# Patient Record
Sex: FEMALE | Race: WHITE | NOT HISPANIC OR LATINO | Employment: UNEMPLOYED | URBAN - METROPOLITAN AREA
[De-identification: names, ages, dates, MRNs, and addresses within clinical notes are randomized per-mention and may not be internally consistent; named-entity substitution may affect disease eponyms.]

---

## 2017-01-09 RX ORDER — LEVOTHYROXINE SODIUM 0.15 MG/1
TABLET ORAL
Qty: 30 TABLET | Refills: 5 | Status: SHIPPED | OUTPATIENT
Start: 2017-01-09 | End: 2017-03-09 | Stop reason: SDUPTHER

## 2017-01-31 DIAGNOSIS — F41.9 ANXIETY AND DEPRESSION: ICD-10-CM

## 2017-01-31 DIAGNOSIS — F32.A ANXIETY AND DEPRESSION: ICD-10-CM

## 2017-01-31 RX ORDER — ALPRAZOLAM 0.5 MG/1
TABLET ORAL
Qty: 30 TABLET | Refills: 0 | Status: SHIPPED | OUTPATIENT
Start: 2017-01-31 | End: 2017-02-01 | Stop reason: SDUPTHER

## 2017-02-01 DIAGNOSIS — F41.9 ANXIETY AND DEPRESSION: ICD-10-CM

## 2017-02-01 DIAGNOSIS — F32.A ANXIETY AND DEPRESSION: ICD-10-CM

## 2017-02-01 RX ORDER — ALPRAZOLAM 0.5 MG/1
0.5 TABLET ORAL DAILY PRN
Qty: 30 TABLET | Refills: 0 | OUTPATIENT
Start: 2017-02-01 | End: 2017-03-09 | Stop reason: SDUPTHER

## 2017-02-03 DIAGNOSIS — F32.A ANXIETY AND DEPRESSION: ICD-10-CM

## 2017-02-03 DIAGNOSIS — F41.9 ANXIETY AND DEPRESSION: ICD-10-CM

## 2017-02-03 RX ORDER — CLONAZEPAM 0.5 MG/1
0.5 TABLET ORAL DAILY
Qty: 30 TABLET | Refills: 0 | OUTPATIENT
Start: 2017-02-03 | End: 2017-03-09 | Stop reason: SDUPTHER

## 2017-02-03 NOTE — TELEPHONE ENCOUNTER
ANDRES has been printed and placed on your desk.  She skipped last month getting this RF.  She is due for an OV next month to establish with Nuvia Lance for Med. Check

## 2017-03-07 DIAGNOSIS — F32.A ANXIETY AND DEPRESSION: ICD-10-CM

## 2017-03-07 DIAGNOSIS — F41.9 ANXIETY AND DEPRESSION: ICD-10-CM

## 2017-03-07 RX ORDER — ALPRAZOLAM 0.5 MG/1
TABLET ORAL
Qty: 30 TABLET | Refills: 0 | OUTPATIENT
Start: 2017-03-07

## 2017-03-07 RX ORDER — CLONAZEPAM 0.5 MG/1
TABLET ORAL
Qty: 30 TABLET | Refills: 0 | OUTPATIENT
Start: 2017-03-07

## 2017-03-09 ENCOUNTER — OFFICE VISIT (OUTPATIENT)
Dept: FAMILY MEDICINE CLINIC | Facility: CLINIC | Age: 47
End: 2017-03-09

## 2017-03-09 VITALS
DIASTOLIC BLOOD PRESSURE: 82 MMHG | OXYGEN SATURATION: 97 % | RESPIRATION RATE: 18 BRPM | HEIGHT: 68 IN | SYSTOLIC BLOOD PRESSURE: 126 MMHG | BODY MASS INDEX: 25.66 KG/M2 | HEART RATE: 80 BPM | WEIGHT: 169.3 LBS

## 2017-03-09 DIAGNOSIS — E03.9 ACQUIRED HYPOTHYROIDISM: ICD-10-CM

## 2017-03-09 DIAGNOSIS — F41.9 ANXIETY AND DEPRESSION: Primary | ICD-10-CM

## 2017-03-09 DIAGNOSIS — F32.A ANXIETY AND DEPRESSION: Primary | ICD-10-CM

## 2017-03-09 DIAGNOSIS — F41.9 ANXIETY: ICD-10-CM

## 2017-03-09 PROCEDURE — 99213 OFFICE O/P EST LOW 20 MIN: CPT | Performed by: NURSE PRACTITIONER

## 2017-03-09 RX ORDER — CLONAZEPAM 0.5 MG/1
0.5 TABLET ORAL DAILY
Qty: 30 TABLET | Refills: 0 | OUTPATIENT
Start: 2017-03-09 | End: 2017-03-09 | Stop reason: SDUPTHER

## 2017-03-09 RX ORDER — FLUOXETINE HYDROCHLORIDE 40 MG/1
40 CAPSULE ORAL DAILY
Qty: 30 CAPSULE | Refills: 0 | Status: SHIPPED | OUTPATIENT
Start: 2017-03-09 | End: 2017-04-07 | Stop reason: SDUPTHER

## 2017-03-09 RX ORDER — CLONAZEPAM 0.5 MG/1
0.5 TABLET ORAL DAILY
Qty: 30 TABLET | Refills: 0 | Status: SHIPPED | OUTPATIENT
Start: 2017-03-09 | End: 2023-01-05

## 2017-03-09 RX ORDER — ALPRAZOLAM 0.5 MG/1
0.5 TABLET ORAL DAILY PRN
Qty: 30 TABLET | Refills: 0 | OUTPATIENT
Start: 2017-03-09 | End: 2017-03-09 | Stop reason: SDUPTHER

## 2017-03-09 RX ORDER — LEVOTHYROXINE SODIUM 0.15 MG/1
150 TABLET ORAL DAILY
Qty: 30 TABLET | Refills: 1 | Status: SHIPPED | OUTPATIENT
Start: 2017-03-09 | End: 2017-04-07

## 2017-03-09 RX ORDER — ALPRAZOLAM 0.5 MG/1
0.5 TABLET ORAL DAILY PRN
Qty: 30 TABLET | Refills: 0 | Status: SHIPPED | OUTPATIENT
Start: 2017-03-09 | End: 2023-01-05

## 2017-03-09 NOTE — PROGRESS NOTES
"Subjective   Cherelle Bourgeois is a 46 y.o. female.   Is a pt of Dr. Ortiz and is here for her medication refill.  Is not having any problems.    Chief Complaint   Patient presents with   • Anxiety     Medication refills.      Social History   Substance Use Topics   • Smoking status: Never Smoker   • Smokeless tobacco: Never Used   • Alcohol use Yes      Comment: 1-2 per week       History of Present Illness     Review of Systems   Psychiatric/Behavioral: Negative for agitation. The patient is nervous/anxious.         Stress  Depression   anxiety     All other systems reviewed and are negative.      Objective   Vitals:    03/09/17 0907   BP: 126/82   Pulse: 80   Resp: 18   SpO2: 97%   Weight: 169 lb 4.8 oz (76.8 kg)   Height: 68\" (172.7 cm)     Body mass index is 25.74 kg/(m^2).    Physical Exam    Assessment/Plan   Problem List Items Addressed This Visit        Endocrine    Hypothyroidism    Relevant Medications    levothyroxine (SYNTHROID, LEVOTHROID) 150 MCG tablet       Other    Anxiety and depression - Primary    Relevant Medications    FLUoxetine (PROzac) 40 MG capsule    ALPRAZolam (XANAX) 0.5 MG tablet    clonazePAM (KlonoPIN) 0.5 MG tablet      Other Visit Diagnoses     Anxiety        Relevant Medications    FLUoxetine (PROzac) 40 MG capsule         Discussed only being able to give her a month worth of her xanax and klonopin and she wanted to know if Dr Rene would be able to do this and i told her i would inquire.  Car [id number] clean  "

## 2017-04-05 ENCOUNTER — HOSPITAL ENCOUNTER (OUTPATIENT)
Dept: GENERAL RADIOLOGY | Facility: HOSPITAL | Age: 47
Discharge: HOME | End: 2017-04-05
Admitting: NURSE PRACTITIONER

## 2017-04-05 ENCOUNTER — OFFICE VISIT (OUTPATIENT)
Dept: FAMILY MEDICINE CLINIC | Facility: CLINIC | Age: 47
End: 2017-04-05

## 2017-04-05 VITALS
OXYGEN SATURATION: 98 % | BODY MASS INDEX: 25.71 KG/M2 | WEIGHT: 169.1 LBS | SYSTOLIC BLOOD PRESSURE: 130 MMHG | HEART RATE: 77 BPM | DIASTOLIC BLOOD PRESSURE: 88 MMHG

## 2017-04-05 DIAGNOSIS — F32.A ANXIETY AND DEPRESSION: ICD-10-CM

## 2017-04-05 DIAGNOSIS — S22.43XA CLOSED FRACTURE OF MULTIPLE RIBS OF BOTH SIDES, INITIAL ENCOUNTER: ICD-10-CM

## 2017-04-05 DIAGNOSIS — F41.9 ANXIETY AND DEPRESSION: ICD-10-CM

## 2017-04-05 DIAGNOSIS — E03.9 ACQUIRED HYPOTHYROIDISM: ICD-10-CM

## 2017-04-05 DIAGNOSIS — S22.43XA CLOSED FRACTURE OF MULTIPLE RIBS OF BOTH SIDES, INITIAL ENCOUNTER: Primary | ICD-10-CM

## 2017-04-05 LAB
25(OH)D3+25(OH)D2 SERPL-MCNC: 29.1 NG/ML (ref 30–100)
TSH SERPL DL<=0.005 MIU/L-ACNC: 11.72 MIU/ML (ref 0.27–4.2)

## 2017-04-05 PROCEDURE — 71020 HC CHEST PA AND LATERAL: CPT

## 2017-04-05 PROCEDURE — 99214 OFFICE O/P EST MOD 30 MIN: CPT | Performed by: NURSE PRACTITIONER

## 2017-04-05 NOTE — PROGRESS NOTES
Subjective   Cherelle Bourgeois is a 46 y.o. female.   Here for medication refills and is requesting a bone density test.  Is on synthroid for her hypothyroidism and is doing well. Last visit her dose was increased and we will need to recheck it. This has been a problem for her for about 2 years.    She reports that for years and years she has had several undocumented rib fractures in the past. Her injuries include just bending over to pick something up and will hear a pop. She has pain afterwards that lasts a couple of weeks and she takes ibuprofen for the pain. She has never had a work up or any blood work or has never seeked any care in the past.    Also still dealing with her anxiety and depression which she reports is about the same. She does well on her medicines and this has been a chronic problem for several years  I explained to her that Dr Rene does not want to continue to refill her xanax and klonopin that she takes daily and suggested a referral to a psychiatrist.    Chief Complaint   Patient presents with   • Hypothyroidism     follow up would like to schedule bone density      Social History   Substance Use Topics   • Smoking status: Never Smoker   • Smokeless tobacco: Never Used   • Alcohol use Yes      Comment: 1-2 per week       History of Present Illness         Review of Systems   Constitutional: Negative for activity change.   Cardiovascular:        Rt ribcage pain with palpation #'s 7&8   Psychiatric/Behavioral: The patient is nervous/anxious.    All other systems reviewed and are negative.      Objective   Vitals:    04/05/17 0958   BP: 130/88   Pulse: 77   SpO2: 98%   Weight: 169 lb 1.6 oz (76.7 kg)     Body mass index is 25.71 kg/(m^2).    Physical Exam   Constitutional: She appears well-developed and well-nourished. No distress.   HENT:   Head: Normocephalic.   Eyes: EOM are normal.   Cardiovascular: Normal rate and regular rhythm.    Pulmonary/Chest: Effort normal and breath sounds normal. No  respiratory distress. She has no rales. She exhibits tenderness.   Rt ribs 7&8   Skin: Skin is warm.   Nursing note and vitals reviewed.      Assessment/Plan   Problem List Items Addressed This Visit        Endocrine    Hypothyroidism    Relevant Orders    TSH       Other    Anxiety and depression    Relevant Orders    Ambulatory Referral to Psychiatry      Other Visit Diagnoses     Closed fracture of multiple ribs of both sides, initial encounter    -  Primary    Relevant Orders    Vitamin D 25 hydroxy    XR Chest PA & Lateral (Completed)         Will call with results of cxr

## 2017-04-07 DIAGNOSIS — F41.9 ANXIETY: ICD-10-CM

## 2017-04-07 RX ORDER — ERGOCALCIFEROL 1.25 MG/1
50000 CAPSULE ORAL
Qty: 52 CAPSULE | Refills: 0 | Status: SHIPPED | OUTPATIENT
Start: 2017-04-07 | End: 2018-12-19 | Stop reason: SDUPTHER

## 2017-04-07 RX ORDER — FLUOXETINE HYDROCHLORIDE 40 MG/1
CAPSULE ORAL
Qty: 30 CAPSULE | Refills: 0 | Status: SHIPPED | OUTPATIENT
Start: 2017-04-07 | End: 2022-11-04

## 2017-04-07 RX ORDER — LEVOTHYROXINE SODIUM 175 UG/1
175 TABLET ORAL DAILY
Qty: 30 TABLET | Refills: 2 | Status: SHIPPED | OUTPATIENT
Start: 2017-04-07 | End: 2018-04-24 | Stop reason: SDUPTHER

## 2017-08-15 ENCOUNTER — TELEPHONE (OUTPATIENT)
Dept: FAMILY MEDICINE CLINIC | Facility: CLINIC | Age: 47
End: 2017-08-15

## 2017-08-15 NOTE — TELEPHONE ENCOUNTER
Pt called and left a VM stating that she is having back pain and would like to speak with someone.    Called pt back she stated she was suffering from back pain, was asking if muscle relaxer's would help her. Advised pt that I could not giver her information related to diagnosing her back pain and what medication she should/should not take. Advised pt to make an appt.

## 2018-01-18 RX ORDER — LEVOTHYROXINE SODIUM 0.15 MG/1
TABLET ORAL
Qty: 30 TABLET | Refills: 0 | Status: SHIPPED | OUTPATIENT
Start: 2018-01-18 | End: 2018-10-16 | Stop reason: DRUGHIGH

## 2018-04-23 ENCOUNTER — OFFICE VISIT (OUTPATIENT)
Dept: FAMILY MEDICINE CLINIC | Facility: CLINIC | Age: 48
End: 2018-04-23

## 2018-04-23 VITALS
HEIGHT: 68 IN | DIASTOLIC BLOOD PRESSURE: 80 MMHG | HEART RATE: 75 BPM | SYSTOLIC BLOOD PRESSURE: 120 MMHG | OXYGEN SATURATION: 98 % | WEIGHT: 172 LBS | BODY MASS INDEX: 26.07 KG/M2

## 2018-04-23 DIAGNOSIS — E03.9 ACQUIRED HYPOTHYROIDISM: Primary | ICD-10-CM

## 2018-04-23 LAB
T4 FREE SERPL-MCNC: 1.08 NG/DL (ref 0.93–1.7)
TSH SERPL DL<=0.005 MIU/L-ACNC: 16.25 MIU/ML (ref 0.27–4.2)

## 2018-04-23 PROCEDURE — 99213 OFFICE O/P EST LOW 20 MIN: CPT | Performed by: NURSE PRACTITIONER

## 2018-04-23 NOTE — PROGRESS NOTES
"Cherelle Bourgeois is a 47 y.o. female.Patient states that she went off her thyroid medication for the last month because it was too high.she feels like it is too high so she stops taking her medicine despite the lab results.  She reports that she feels tired all the time.      Assessment/Plan   Problem List Items Addressed This Visit        Endocrine    Hypothyroidism - Primary    Relevant Orders    TSH (Completed)    T4, free (Completed)    Thyroid Peroxidase Antibody (Completed)      Other Visit Diagnoses    None.            No Follow-up on file.  There are no Patient Instructions on file for this visit.    Chief Complaint   Patient presents with   • Hypothyroidism     Social History   Substance Use Topics   • Smoking status: Never Smoker   • Smokeless tobacco: Never Used   • Alcohol use Yes      Comment: 1-2 per week       History of Present Illness     The following portions of the patient's history were reviewed and updated as appropriate:PMHroutine: Social history , Allergies, Current Medications and Active Problem List    Review of Systems   Constitutional: Positive for fatigue.   Musculoskeletal: Positive for back pain and myalgias.       Objective   Vitals:    04/23/18 1010   BP: 120/80   Pulse: 75   SpO2: 98%   Weight: 78 kg (172 lb)   Height: 172.7 cm (68\")     Body mass index is 26.15 kg/m².  Physical Exam   Constitutional: She appears well-developed and well-nourished. No distress.   HENT:   Head: Normocephalic and atraumatic.   Right Ear: External ear normal.   Left Ear: External ear normal.   Eyes: EOM are normal.   Neck: Neck supple. No thyromegaly present.   Cardiovascular: Normal rate, regular rhythm and normal heart sounds.    Pulmonary/Chest: Effort normal and breath sounds normal.   Musculoskeletal: Normal range of motion.   Neurological: She is alert.   Skin: Skin is warm.   Nursing note and vitals reviewed.    Reviewed Data:  Office Visit on 04/23/2018   Component Date Value Ref Range Status   • " TSH 04/23/2018 16.250* 0.270 - 4.200 mIU/mL Final   • Free T4 04/23/2018 1.08  0.93 - 1.70 ng/dL Final   • Thyroid Peroxidase Antibody 04/24/2018 >600* 0 - 34 IU/mL Final     Lab work and referral to endocrinology.

## 2018-04-24 DIAGNOSIS — E03.8 HYPOTHYROIDISM DUE TO HASHIMOTO'S THYROIDITIS: Primary | ICD-10-CM

## 2018-04-24 DIAGNOSIS — E06.3 HYPOTHYROIDISM DUE TO HASHIMOTO'S THYROIDITIS: Primary | ICD-10-CM

## 2018-04-24 LAB — THYROPEROXIDASE AB SERPL-ACNC: >600 IU/ML (ref 0–34)

## 2018-04-24 RX ORDER — LEVOTHYROXINE SODIUM 175 UG/1
175 TABLET ORAL DAILY
Qty: 30 TABLET | Refills: 1 | Status: SHIPPED | OUTPATIENT
Start: 2018-04-24 | End: 2018-06-19 | Stop reason: SDUPTHER

## 2018-04-24 NOTE — PATIENT INSTRUCTIONS
Hypothyroidism  Hypothyroidism is a disorder of the thyroid. The thyroid is a large gland that is located in the lower front of the neck. The thyroid releases hormones that control how the body works. With hypothyroidism, the thyroid does not make enough of these hormones.  What are the causes?  Causes of hypothyroidism may include:  · Viral infections.  · Pregnancy.  · Your own defense system (immune system) attacking your thyroid.  · Certain medicines.  · Birth defects.  · Past radiation treatments to your head or neck.  · Past treatment with radioactive iodine.  · Past surgical removal of part or all of your thyroid.  · Problems with the gland that is located in the center of your brain (pituitary).  What are the signs or symptoms?  Signs and symptoms of hypothyroidism may include:  · Feeling as though you have no energy (lethargy).  · Inability to tolerate cold.  · Weight gain that is not explained by a change in diet or exercise habits.  · Dry skin.  · Coarse hair.  · Menstrual irregularity.  · Slowing of thought processes.  · Constipation.  · Sadness or depression.  How is this diagnosed?  Your health care provider may diagnose hypothyroidism with blood tests and ultrasound tests.  How is this treated?  Hypothyroidism is treated with medicine that replaces the hormones that your body does not make. After you begin treatment, it may take several weeks for symptoms to go away.  Follow these instructions at home:  · Take medicines only as directed by your health care provider.  · If you start taking any new medicines, tell your health care provider.  · Keep all follow-up visits as directed by your health care provider. This is important. As your condition improves, your dosage needs may change. You will need to have blood tests regularly so that your health care provider can watch your condition.  Contact a health care provider if:  · Your symptoms do not get better with treatment.  · You are taking thyroid  replacement medicine and:  ¨ You sweat excessively.  ¨ You have tremors.  ¨ You feel anxious.  ¨ You lose weight rapidly.  ¨ You cannot tolerate heat.  ¨ You have emotional swings.  ¨ You have diarrhea.  ¨ You feel weak.  Get help right away if:  · You develop chest pain.  · You develop an irregular heartbeat.  · You develop a rapid heartbeat.  This information is not intended to replace advice given to you by your health care provider. Make sure you discuss any questions you have with your health care provider.  Document Released: 12/18/2006 Document Revised: 05/25/2017 Document Reviewed: 05/05/2015  [employer] Interactive Patient Education © 2017 [employer]

## 2018-06-19 RX ORDER — LEVOTHYROXINE SODIUM 175 UG/1
175 TABLET ORAL DAILY
Qty: 30 TABLET | Refills: 0 | Status: SHIPPED | OUTPATIENT
Start: 2018-06-19 | End: 2019-03-11

## 2018-10-16 ENCOUNTER — OFFICE VISIT (OUTPATIENT)
Dept: FAMILY MEDICINE CLINIC | Facility: CLINIC | Age: 48
End: 2018-10-16

## 2018-10-16 VITALS
BODY MASS INDEX: 25.46 KG/M2 | WEIGHT: 168 LBS | HEART RATE: 80 BPM | RESPIRATION RATE: 16 BRPM | SYSTOLIC BLOOD PRESSURE: 138 MMHG | OXYGEN SATURATION: 99 % | DIASTOLIC BLOOD PRESSURE: 74 MMHG | HEIGHT: 68 IN

## 2018-10-16 DIAGNOSIS — B30.9 ACUTE VIRAL CONJUNCTIVITIS OF RIGHT EYE: ICD-10-CM

## 2018-10-16 DIAGNOSIS — E03.9 ACQUIRED HYPOTHYROIDISM: Primary | ICD-10-CM

## 2018-10-16 DIAGNOSIS — G44.009 CLUSTER HEADACHE, NOT INTRACTABLE, UNSPECIFIED CHRONICITY PATTERN: ICD-10-CM

## 2018-10-16 LAB — TSH SERPL DL<=0.005 MIU/L-ACNC: 11.93 MIU/ML (ref 0.27–4.2)

## 2018-10-16 PROCEDURE — 99214 OFFICE O/P EST MOD 30 MIN: CPT | Performed by: NURSE PRACTITIONER

## 2018-10-16 RX ORDER — MOXIFLOXACIN 5 MG/ML
1 SOLUTION/ DROPS OPHTHALMIC 3 TIMES DAILY
Qty: 3 ML | Refills: 0 | Status: SHIPPED | OUTPATIENT
Start: 2018-10-16 | End: 2019-03-11

## 2018-10-16 NOTE — PROGRESS NOTES
"Cherelle Bourgeois is a 48 y.o. female.      Assessment/Plan   Problem List Items Addressed This Visit        Endocrine    Hypothyroidism - Primary    Relevant Orders    TSH      Other Visit Diagnoses     Acute viral conjunctivitis of right eye        Relevant Medications    moxifloxacin (VIGAMOX) 0.5 % ophthalmic solution    Cluster headache, not intractable, unspecified chronicity pattern                 No Follow-up on file.  There are no Patient Instructions on file for this visit.    Chief Complaint   Patient presents with   • Eye Problem   • Headache     Social History   Substance Use Topics   • Smoking status: Never Smoker   • Smokeless tobacco: Never Used   • Alcohol use Yes      Comment: 1-2 per week       History of Present Illness   Cherelle is here w/ red, itchy rt eye.  She states she has been doing yard work and originally thought it was allergy related. She has also resumed wearing contacts recentlu.  She states itching was severe last night and she used an ice pack.  No otc drops.    Cherelle is also c/o headache.  She states for the past 3 weeks she has had sporadic, sharp shooting pain in her head.  She states it lasts only for a few seconds and happens several times throughout the day.    H/O hypothyroid and cancelled her appointment with the endocrinologist.    The following portions of the patient's history were reviewed and updated as appropriate:PMHroutine: Social history , Allergies, Current Medications, Active Problem List and Health Maintenance    Review of Systems   Constitutional: Negative for fever.   HENT: Negative for congestion, ear discharge, sinus pain and sinus pressure.    Eyes: Positive for redness and itching.   Neurological: Positive for headaches.   All other systems reviewed and are negative.      Objective   Vitals:    10/16/18 0734   BP: 138/74   Pulse: 80   Resp: 16   SpO2: 99%   Weight: 76.2 kg (168 lb)   Height: 172.7 cm (68\")     Body mass index is 25.54 kg/m².  Physical " Exam   Constitutional: She appears well-developed and well-nourished. No distress.   HENT:   Head: Normocephalic and atraumatic.   Right Ear: External ear normal.   Left Ear: External ear normal.   Mouth/Throat: Oropharynx is clear and moist.   Eyes: Pupils are equal, round, and reactive to light. EOM are normal.   Fundoscopic exam:       The right eye shows no AV nicking and no exudate.        The left eye shows no AV nicking and no exudate.   Neck: Neck supple. No thyromegaly present.   Cardiovascular: Normal rate and regular rhythm.    Pulmonary/Chest: Effort normal and breath sounds normal.   Musculoskeletal: Normal range of motion.   Neurological: She is alert.   Nursing note and vitals reviewed.    Reviewed Data:  No visits with results within 1 Month(s) from this visit.   Latest known visit with results is:   Office Visit on 04/23/2018   Component Date Value Ref Range Status   • TSH 04/23/2018 16.250* 0.270 - 4.200 mIU/mL Final   • Free T4 04/23/2018 1.08  0.93 - 1.70 ng/dL Final   • Thyroid Peroxidase Antibody 04/23/2018 >600* 0 - 34 IU/mL Final     Will keep a headache diary and get back with us.  Discussed calling the endocrinologist back but she doesn't seem interested.

## 2018-10-22 DIAGNOSIS — E03.9 ACQUIRED HYPOTHYROIDISM: Primary | ICD-10-CM

## 2018-12-18 ENCOUNTER — OFFICE VISIT (OUTPATIENT)
Dept: ENDOCRINOLOGY | Age: 48
End: 2018-12-18

## 2018-12-18 VITALS
BODY MASS INDEX: 31.47 KG/M2 | DIASTOLIC BLOOD PRESSURE: 78 MMHG | HEIGHT: 62 IN | OXYGEN SATURATION: 98 % | HEART RATE: 86 BPM | SYSTOLIC BLOOD PRESSURE: 130 MMHG | WEIGHT: 171 LBS

## 2018-12-18 DIAGNOSIS — E03.9 ACQUIRED HYPOTHYROIDISM: Primary | ICD-10-CM

## 2018-12-18 DIAGNOSIS — R53.82 CHRONIC FATIGUE: ICD-10-CM

## 2018-12-18 LAB
25(OH)D3+25(OH)D2 SERPL-MCNC: 26.1 NG/ML (ref 30–100)
FOLATE SERPL-MCNC: 16.13 NG/ML (ref 4.78–24.2)
T4 FREE SERPL-MCNC: 0.98 NG/DL (ref 0.93–1.7)
TSH SERPL DL<=0.005 MIU/L-ACNC: 9.4 MIU/ML (ref 0.27–4.2)
VIT B12 SERPL-MCNC: 447 PG/ML (ref 211–946)

## 2018-12-18 PROCEDURE — 99204 OFFICE O/P NEW MOD 45 MIN: CPT | Performed by: INTERNAL MEDICINE

## 2018-12-18 NOTE — PROGRESS NOTES
Chief Complaint   Patient presents with   • Hypothyroidism   NEW PATIENT APPOINTMENT/ HYPOTHYROIDISM    Cherelle Bourgeois 48 y.o. WF presents as a new patient for the evaluation of Hypothyroidism. Consulted by Miss. Casi ONEAL  Pt was diagnosed with hypothyroidism few years ago when she had issues with fatigue and depression.   When her thyroid levels were abnormal, she was started on the thyroid medication.   Currently on levothyroxine 175 mcg oral daily, prior to that she was on 150 mcg oral daily.   Not taking the medication for about 3 weeks now as she is coming to see me.     She complains of feeling tired still, gaining weight of about 5 - 6 pounds in the last 1 - 2 months, c/o diarrhea 3 BM per day, no hair loss, no increased sweating, some dry skin, sleep is bad due to waking up in sleep. Never had sleep study. c/o heat intolerance and no cold intolerance. c/o tremors occasionally, no racing of heart and no eye symptoms.   Denied c/o difficulty breathing, swallowing and change in voice.   No family hx of thyroid disease.     Menarche at age 10, periods are regular when she had them. She had hysterectomy at age 38, due to endometriosis.   She has 1 kid of her own.     Reviewed primary care physician's/consulting physician documentation and lab results :     I have reviewed the patient's allergies, medicines, past medical hx, family hx and social hx in detail.    Past Medical History:   Diagnosis Date   • Anxiety and depression     denies SI/HI. Doing well on meds.  Risk/benefits of meds discussed (abuse, addiction, dementia, CVA).  Car, contract and urine tox in chart.   • Broken rib     10 days ago leaned over to get something out of car   • Chronic back pain     followed by chiropractor   • Hypothyroidism    • Postmenopausal     due to hysterectomy       Family History   Problem Relation Age of Onset   • Coronary artery disease Father 42   • Coronary artery disease Sister 50        (50's)   • Coronary  artery disease Brother 42   • Coronary artery disease Maternal Grandfather 40        (40's)   • Coronary artery disease Paternal Grandfather 41   • Coronary artery disease Sister 50        (50's)   • Hypertension Mother        Social History     Socioeconomic History   • Marital status:      Spouse name: Not on file   • Number of children: Not on file   • Years of education: Not on file   • Highest education level: Not on file   Social Needs   • Financial resource strain: Not on file   • Food insecurity - worry: Not on file   • Food insecurity - inability: Not on file   • Transportation needs - medical: Not on file   • Transportation needs - non-medical: Not on file   Occupational History   • Not on file   Tobacco Use   • Smoking status: Never Smoker   • Smokeless tobacco: Never Used   Substance and Sexual Activity   • Alcohol use: Yes     Comment: 1-2 per week   • Drug use: No   • Sexual activity: Defer   Other Topics Concern   • Not on file   Social History Narrative   • Not on file       Allergies   Allergen Reactions   • Amoxicillin Hives   • Augmentin [Amoxicillin-Pot Clavulanate] Hives   • Keflex [Cephalexin] Hives         Current Outpatient Medications:   •  ALPRAZolam (XANAX) 0.5 MG tablet, Take 1 tablet by mouth Daily As Needed for Anxiety., Disp: 30 tablet, Rfl: 0  •  aspirin 81 MG chewable tablet, Chew 81 mg daily., Disp: , Rfl:   •  clonazePAM (KlonoPIN) 0.5 MG tablet, Take 1 tablet by mouth Daily., Disp: 30 tablet, Rfl: 0  •  FLUoxetine (PROzac) 40 MG capsule, TAKE 1 CAPSULE BY MOUTH DAILY (Patient taking differently: Taking 3 20mg Capsules daily), Disp: 30 capsule, Rfl: 0  •  moxifloxacin (VIGAMOX) 0.5 % ophthalmic solution, Administer 1 drop to the right eye 3 (Three) Times a Day., Disp: 3 mL, Rfl: 0  •  vitamin D (ERGOCALCIFEROL) 29028 UNITS capsule capsule, Take 1 capsule by mouth Every 7 (Seven) Days., Disp: 52 capsule, Rfl: 0  •  fluticasone (FLONASE) 50 MCG/ACT nasal spray, 2 sprays into  "each nostril daily. Administer 2 sprays in each nostril for each dose., Disp: , Rfl:   •  levothyroxine (SYNTHROID, LEVOTHROID) 175 MCG tablet, TAKE 1 TABLET BY MOUTH DAILY, Disp: 30 tablet, Rfl: 0     Review of Systems   Constitutional: Positive for fatigue. Negative for appetite change and fever.   Eyes: Negative for visual disturbance.   Respiratory: Negative for shortness of breath.    Cardiovascular: Negative for palpitations and leg swelling.   Gastrointestinal: Negative for abdominal pain and vomiting.   Endocrine: Negative for polydipsia and polyuria.   Musculoskeletal: Negative for joint swelling and neck pain.   Skin: Negative for rash.   Neurological: Negative for weakness and numbness.   Psychiatric/Behavioral: Negative for behavioral problems.       Objective:    /78   Pulse 86   Ht 157.5 cm (62\")   Wt 77.6 kg (171 lb)   SpO2 98%   BMI 31.28 kg/m²     Physical Exam   Constitutional: She is oriented to person, place, and time. She appears well-nourished.   Obese     HENT:   Head: Normocephalic and atraumatic.   Wide neck   Eyes: Conjunctivae and EOM are normal. No scleral icterus.   Neck: Normal range of motion. Neck supple. No thyromegaly present.   Acanthosis nigricans   Cardiovascular: Normal rate and normal heart sounds.   Pulmonary/Chest: Effort normal and breath sounds normal. No stridor. She has no wheezes.   Abdominal: Soft. Bowel sounds are normal. She exhibits no distension. There is no tenderness.   Central obesity   Musculoskeletal: She exhibits no edema or tenderness.   Neurological: She is alert and oriented to person, place, and time.   Skin: Skin is warm and dry. She is not diaphoretic.   Psychiatric: She has a normal mood and affect.   Vitals reviewed.      Results Review:    I reviewed the patient's new clinical results.    Office Visit on 10/16/2018   Component Date Value Ref Range Status   • TSH 10/16/2018 11.930* 0.270 - 4.200 mIU/mL Final         Cherelle was seen today " "for hypothyroidism.    Diagnoses and all orders for this visit:    Acquired hypothyroidism  -     TSH  -     T4, Free  -     Vitamin D 25 Hydroxy  -     Vitamin B12 & Folate  -     TSH; Future  -     T4, Free; Future  -     Hemoglobin A1c; Future  -     Basic Metabolic Panel; Future  -     Lipid Panel; Future  -     Vitamin B12 & Folate; Future  -     Vitamin D 25 Hydroxy; Future  -     TSH; Future  -     T4, Free; Future    Chronic fatigue  -     TSH  -     T4, Free  -     Vitamin D 25 Hydroxy  -     Vitamin B12 & Folate  -     TSH; Future  -     T4, Free; Future  -     Hemoglobin A1c; Future  -     Basic Metabolic Panel; Future  -     Lipid Panel; Future  -     Vitamin B12 & Folate; Future  -     Vitamin D 25 Hydroxy; Future      Hypothyroidism-levels are not stable  Patient is currently not on any thyroid medication  Will check a TSH, free T4 levels  Based on her levels would consider starting the patient on Synthroid.    Will check HbA1c, lipid panel.    Will consider performing thyroid ultrasound once her thyroid levels have normalized.    Discussed with the patient not to miss her thyroid medication and to take it on empty stomach at least 30 minutes before taking the rest of the medications.    Thank you for asking me to see your patient, Cherelle Bourgeois in consultation.        Temi Solomon MD  12/18/18    [facility] Dragon / transcription disclaimer:     \"Dictated utilizing Dragon dictation\".          "

## 2018-12-19 RX ORDER — ERGOCALCIFEROL 1.25 MG/1
50000 CAPSULE ORAL
Qty: 52 CAPSULE | Refills: 0 | Status: SHIPPED | OUTPATIENT
Start: 2018-12-19 | End: 2020-01-06

## 2018-12-19 RX ORDER — LEVOTHYROXINE SODIUM 112 UG/1
112 TABLET ORAL DAILY
Qty: 30 TABLET | Refills: 11 | Status: SHIPPED | OUTPATIENT
Start: 2018-12-19 | End: 2019-02-06 | Stop reason: DRUGHIGH

## 2019-01-29 ENCOUNTER — RESULTS ENCOUNTER (OUTPATIENT)
Dept: ENDOCRINOLOGY | Age: 49
End: 2019-01-29

## 2019-01-29 DIAGNOSIS — E03.9 ACQUIRED HYPOTHYROIDISM: ICD-10-CM

## 2019-02-04 LAB
T4 FREE SERPL-MCNC: 1.22 NG/DL (ref 0.93–1.7)
TSH SERPL DL<=0.005 MIU/L-ACNC: 7.42 MIU/ML (ref 0.27–4.2)

## 2019-02-06 RX ORDER — LEVOTHYROXINE SODIUM 100 MCG
100 TABLET ORAL DAILY
Qty: 30 TABLET | Refills: 11 | Status: SHIPPED | OUTPATIENT
Start: 2019-02-06 | End: 2019-03-11

## 2019-02-27 ENCOUNTER — LAB (OUTPATIENT)
Dept: ENDOCRINOLOGY | Age: 49
End: 2019-02-27

## 2019-02-27 DIAGNOSIS — E03.9 ACQUIRED HYPOTHYROIDISM: ICD-10-CM

## 2019-02-27 DIAGNOSIS — R53.82 CHRONIC FATIGUE: ICD-10-CM

## 2019-02-28 LAB
25(OH)D3+25(OH)D2 SERPL-MCNC: 30 NG/ML (ref 30–100)
BUN SERPL-MCNC: 11 MG/DL (ref 6–20)
BUN/CREAT SERPL: 13.1 (ref 7–25)
CALCIUM SERPL-MCNC: 10.6 MG/DL (ref 8.6–10.5)
CHLORIDE SERPL-SCNC: 102 MMOL/L (ref 98–107)
CHOLEST SERPL-MCNC: 233 MG/DL (ref 0–200)
CO2 SERPL-SCNC: 30 MMOL/L (ref 22–29)
CREAT SERPL-MCNC: 0.84 MG/DL (ref 0.57–1)
FOLATE SERPL-MCNC: >20 NG/ML (ref 4.78–24.2)
GLUCOSE SERPL-MCNC: 94 MG/DL (ref 65–99)
HBA1C MFR BLD: 5.4 % (ref 4.8–5.6)
HDLC SERPL-MCNC: 59 MG/DL (ref 40–60)
INTERPRETATION: NORMAL
LDLC SERPL CALC-MCNC: 154 MG/DL (ref 0–100)
Lab: NORMAL
POTASSIUM SERPL-SCNC: 5.2 MMOL/L (ref 3.5–5.2)
SODIUM SERPL-SCNC: 137 MMOL/L (ref 136–145)
T4 FREE SERPL-MCNC: 1.53 NG/DL (ref 0.93–1.7)
TRIGL SERPL-MCNC: 99 MG/DL (ref 0–150)
TSH SERPL DL<=0.005 MIU/L-ACNC: 6.13 MIU/ML (ref 0.27–4.2)
VIT B12 SERPL-MCNC: 570 PG/ML (ref 211–946)
VLDLC SERPL CALC-MCNC: 19.8 MG/DL (ref 5–40)

## 2019-03-11 ENCOUNTER — OFFICE VISIT (OUTPATIENT)
Dept: ENDOCRINOLOGY | Age: 49
End: 2019-03-11

## 2019-03-11 VITALS
OXYGEN SATURATION: 98 % | DIASTOLIC BLOOD PRESSURE: 82 MMHG | BODY MASS INDEX: 31.47 KG/M2 | SYSTOLIC BLOOD PRESSURE: 118 MMHG | HEIGHT: 62 IN | HEART RATE: 78 BPM | WEIGHT: 171 LBS

## 2019-03-11 DIAGNOSIS — E03.9 ACQUIRED HYPOTHYROIDISM: Primary | ICD-10-CM

## 2019-03-11 DIAGNOSIS — R53.82 CHRONIC FATIGUE: ICD-10-CM

## 2019-03-11 PROCEDURE — 99214 OFFICE O/P EST MOD 30 MIN: CPT | Performed by: INTERNAL MEDICINE

## 2019-03-11 RX ORDER — LEVOTHYROXINE SODIUM 112 MCG
112 TABLET ORAL DAILY
Qty: 30 TABLET | Refills: 11 | Status: SHIPPED | OUTPATIENT
Start: 2019-03-11 | End: 2019-11-11
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
## 2019-04-22 ENCOUNTER — RESULTS ENCOUNTER (OUTPATIENT)
Dept: ENDOCRINOLOGY | Age: 49
End: 2019-04-22

## 2019-04-22 DIAGNOSIS — E03.9 ACQUIRED HYPOTHYROIDISM: ICD-10-CM

## 2019-04-22 DIAGNOSIS — R53.82 CHRONIC FATIGUE: ICD-10-CM

## 2019-09-07 ENCOUNTER — RESULTS ENCOUNTER (OUTPATIENT)
Dept: ENDOCRINOLOGY | Age: 49
End: 2019-09-07

## 2019-09-07 DIAGNOSIS — E03.9 ACQUIRED HYPOTHYROIDISM: ICD-10-CM

## 2019-09-07 DIAGNOSIS — R53.82 CHRONIC FATIGUE: ICD-10-CM

## 2019-10-28 LAB
25(OH)D3+25(OH)D2 SERPL-MCNC: 32.6 NG/ML (ref 30–100)
BUN SERPL-MCNC: 12 MG/DL (ref 6–20)
BUN/CREAT SERPL: 17.4 (ref 7–25)
CALCIUM SERPL-MCNC: 9.8 MG/DL (ref 8.6–10.5)
CHLORIDE SERPL-SCNC: 103 MMOL/L (ref 98–107)
CHOLEST SERPL-MCNC: 230 MG/DL (ref 0–200)
CO2 SERPL-SCNC: 29.4 MMOL/L (ref 22–29)
CREAT SERPL-MCNC: 0.69 MG/DL (ref 0.57–1)
FOLATE SERPL-MCNC: 12.8 NG/ML (ref 4.78–24.2)
GLUCOSE SERPL-MCNC: 93 MG/DL (ref 65–99)
HBA1C MFR BLD: 5.5 % (ref 4.8–5.6)
HDLC SERPL-MCNC: 54 MG/DL (ref 40–60)
INTERPRETATION: NORMAL
LDLC SERPL CALC-MCNC: 149 MG/DL (ref 0–100)
Lab: NORMAL
POTASSIUM SERPL-SCNC: 4.9 MMOL/L (ref 3.5–5.2)
SODIUM SERPL-SCNC: 142 MMOL/L (ref 136–145)
T4 FREE SERPL-MCNC: 1.55 NG/DL (ref 0.93–1.7)
TRIGL SERPL-MCNC: 137 MG/DL (ref 0–150)
TSH SERPL DL<=0.005 MIU/L-ACNC: 4.3 UIU/ML (ref 0.27–4.2)
VIT B12 SERPL-MCNC: 384 PG/ML (ref 211–946)
VLDLC SERPL CALC-MCNC: 27.4 MG/DL

## 2019-11-11 ENCOUNTER — OFFICE VISIT (OUTPATIENT)
Dept: ENDOCRINOLOGY | Age: 49
End: 2019-11-11

## 2019-11-11 VITALS
DIASTOLIC BLOOD PRESSURE: 78 MMHG | HEIGHT: 63 IN | WEIGHT: 170 LBS | BODY MASS INDEX: 30.12 KG/M2 | SYSTOLIC BLOOD PRESSURE: 120 MMHG | OXYGEN SATURATION: 99 % | HEART RATE: 64 BPM

## 2019-11-11 DIAGNOSIS — E03.9 ACQUIRED HYPOTHYROIDISM: Primary | ICD-10-CM

## 2019-11-11 DIAGNOSIS — R53.82 CHRONIC FATIGUE: ICD-10-CM

## 2019-11-11 DIAGNOSIS — M25.561 ARTHRALGIA OF BOTH KNEES: ICD-10-CM

## 2019-11-11 DIAGNOSIS — Z23 NEED FOR IMMUNIZATION AGAINST INFLUENZA: Primary | ICD-10-CM

## 2019-11-11 DIAGNOSIS — M25.562 ARTHRALGIA OF BOTH KNEES: ICD-10-CM

## 2019-11-11 PROCEDURE — 90471 IMMUNIZATION ADMIN: CPT | Performed by: INTERNAL MEDICINE

## 2019-11-11 PROCEDURE — 99214 OFFICE O/P EST MOD 30 MIN: CPT | Performed by: INTERNAL MEDICINE

## 2019-11-11 PROCEDURE — 90674 CCIIV4 VAC NO PRSV 0.5 ML IM: CPT | Performed by: INTERNAL MEDICINE

## 2019-11-11 RX ORDER — LEVOTHYROXINE SODIUM 137 MCG
137 TABLET ORAL DAILY
Qty: 30 TABLET | Refills: 11 | Status: SHIPPED | OUTPATIENT
Start: 2019-11-11 | End: 2021-07-27 | Stop reason: SDDI
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
## 2019-12-12 ENCOUNTER — OFFICE VISIT (OUTPATIENT)
Dept: FAMILY MEDICINE CLINIC | Facility: CLINIC | Age: 49
End: 2019-12-12

## 2019-12-12 ENCOUNTER — TELEPHONE (OUTPATIENT)
Dept: FAMILY MEDICINE CLINIC | Facility: CLINIC | Age: 49
End: 2019-12-12

## 2019-12-12 VITALS
SYSTOLIC BLOOD PRESSURE: 140 MMHG | OXYGEN SATURATION: 98 % | HEIGHT: 63 IN | BODY MASS INDEX: 30.48 KG/M2 | DIASTOLIC BLOOD PRESSURE: 80 MMHG | WEIGHT: 172 LBS | HEART RATE: 76 BPM

## 2019-12-12 DIAGNOSIS — M25.549 ARTHRALGIA OF HAND, UNSPECIFIED LATERALITY: Primary | ICD-10-CM

## 2019-12-12 PROCEDURE — 99213 OFFICE O/P EST LOW 20 MIN: CPT | Performed by: NURSE PRACTITIONER

## 2019-12-12 RX ORDER — MELOXICAM 7.5 MG/1
7.5 TABLET ORAL DAILY
Qty: 30 TABLET | Refills: 0 | Status: SHIPPED | OUTPATIENT
Start: 2019-12-12 | End: 2021-07-27

## 2019-12-12 NOTE — PROGRESS NOTES
Subjective back pain forever and this morning she had joint pain all over  Cherelle Bourgeois is a 49 y.o. female.     History of Present Illness   Started with joint pain all over yesterday. Her son has autoimmune encephalitis.  The following portions of the patient's history were reviewed and updated as appropriate: allergies, current medications, past family history, past medical history, past social history, past surgical history and problem list.    Review of Systems   Musculoskeletal:        Joint pain       Objective   Physical Exam   Constitutional: She is oriented to person, place, and time. She appears well-developed and well-nourished.   HENT:   Head: Normocephalic and atraumatic.   Mouth/Throat: Oropharynx is clear and moist.   Eyes: Pupils are equal, round, and reactive to light. Conjunctivae and EOM are normal.   Neck: Neck supple.   Cardiovascular: Normal rate and regular rhythm.   Pulmonary/Chest: Effort normal and breath sounds normal. No respiratory distress.   Abdominal: Bowel sounds are normal.   Musculoskeletal: Normal range of motion. She exhibits no tenderness.   Lymphadenopathy:     She has no cervical adenopathy.   Neurological: She is alert and oriented to person, place, and time.   Skin: Skin is warm and dry.   Psychiatric: She has a normal mood and affect.   Nursing note and vitals reviewed.        Assessment/Plan   Cherelle was seen today for joint swelling.    Diagnoses and all orders for this visit:    Arthralgia of hand, unspecified laterality  -     CBC (No Diff)  -     Uric acid  -     Antistreptolysin O screen  -     Rheumatoid factor  -     C-reactive protein  -     TAL    Other orders  -     meloxicam (MOBIC) 7.5 MG tablet; Take 1 tablet by mouth Daily.

## 2019-12-12 NOTE — PATIENT INSTRUCTIONS
Joint Pain    Joint pain can be caused by many things. It is likely to go away if you follow instructions from your doctor for taking care of yourself at home. Sometimes, you may need more treatment.  Follow these instructions at home:  Managing pain, stiffness, and swelling    · If told, put ice on the painful area.  ? Put ice in a plastic bag.  ? Place a towel between your skin and the bag.  ? Leave the ice on for 20 minutes, 2-3 times a day.  · If told, put heat on the painful area. Do this as often as told by your doctor. Use the heat source that your doctor recommends, such as a moist heat pack or a heating pad.  ? Place a towel between your skin and the heat source.  ? Leave the heat on for 20-30 minutes.  ? Take off the heat if your skin gets bright red. This is especially important if you are unable to feel pain, heat, or cold. You may have a greater risk of getting burned.  · Move your fingers or toes below the painful joint often. This helps with stiffness and swelling.  · If possible, raise (elevate) the painful joint above the level of your heart while you are sitting or lying down. To do this, try putting a few pillows under the painful joint.  Activity  · Rest the painful joint for as long as told. Do not do things that cause pain or make your pain worse.  · Begin exercising or stretching the affected area, as told by your doctor. Ask your doctor what types of exercise are safe for you.  If you have an elastic bandage, sling, or splint:  · Wear the device as told by your doctor. Take it off only as told by your doctor.  · Loosen the device if your fingers or toes below the joint:  ? Tingle.  ? Lose feeling (get numb).  ? Get cold and blue.  · Keep the device clean.  · Ask your doctor if you should take off the device before bathing. You may need to cover it with a watertight covering when you take a bath or a shower.  General instructions  · Take over-the-counter and prescription medicines only as told  by your doctor.  · Do not use any products that contain nicotine or tobacco. These include cigarettes and e-cigarettes. If you need help quitting, ask your doctor.  · Keep all follow-up visits as told by your doctor. This is important.  Contact a doctor if:  · You have pain that gets worse and does not get better with medicine.  · Your joint pain does not get better in 3 days.  · You have more bruising or swelling.  · You have a fever.  · You lose 10 lb (4.5 kg) or more without trying.  Get help right away if:  · You cannot move the joint.  · Your fingers or toes tingle, lose feeling, or get cold and blue.  · You have a fever along with a joint that is red, warm, and swollen.  Summary  · Joint pain can be caused by many things. It often goes away if you follow instructions from your doctor for taking care of yourself at home.  · Rest the painful joint for as long as told. Do not do things that cause pain or make your pain worse.  · Take over-the-counter and prescription medicines only as told by your doctor.  This information is not intended to replace advice given to you by your health care provider. Make sure you discuss any questions you have with your health care provider.  Document Released: 12/06/2010 Document Revised: 10/03/2018 Document Reviewed: 10/03/2018  [employer] Interactive Patient Education © 2019 [employer]

## 2019-12-13 LAB
ANA SER QL: NEGATIVE
CRP SERPL-MCNC: 0.07 MG/DL (ref 0–0.5)
ERYTHROCYTE [DISTWIDTH] IN BLOOD BY AUTOMATED COUNT: 13.2 % (ref 12.3–15.4)
HCT VFR BLD AUTO: 46 % (ref 34–46.6)
HGB BLD-MCNC: 15 G/DL (ref 12–15.9)
MCH RBC QN AUTO: 27.3 PG (ref 26.6–33)
MCHC RBC AUTO-ENTMCNC: 32.6 G/DL (ref 31.5–35.7)
MCV RBC AUTO: 83.6 FL (ref 79–97)
PLATELET # BLD AUTO: 254 10*3/MM3 (ref 140–450)
RBC # BLD AUTO: 5.5 10*6/MM3 (ref 3.77–5.28)
URATE SERPL-MCNC: 3.7 MG/DL (ref 2.4–5.7)
WBC # BLD AUTO: 5.88 10*3/MM3 (ref 3.4–10.8)

## 2020-01-06 RX ORDER — ERGOCALCIFEROL 1.25 MG/1
CAPSULE ORAL
Qty: 12 CAPSULE | Refills: 1 | Status: SHIPPED | OUTPATIENT
Start: 2020-01-06 | End: 2020-09-01

## 2020-09-01 RX ORDER — ERGOCALCIFEROL 1.25 MG/1
CAPSULE ORAL
Qty: 12 CAPSULE | Refills: 1 | Status: SHIPPED | OUTPATIENT
Start: 2020-09-01

## 2020-11-30 RX ORDER — LEVOTHYROXINE SODIUM 137 MCG
137 TABLET ORAL DAILY
Qty: 30 TABLET | Refills: 11 | OUTPATIENT
Start: 2020-11-30

## 2020-12-02 ENCOUNTER — OFFICE VISIT (OUTPATIENT)
Dept: FAMILY MEDICINE CLINIC | Facility: CLINIC | Age: 50
End: 2020-12-02

## 2020-12-02 VITALS
RESPIRATION RATE: 16 BRPM | BODY MASS INDEX: 29.05 KG/M2 | TEMPERATURE: 96.9 F | SYSTOLIC BLOOD PRESSURE: 124 MMHG | WEIGHT: 164 LBS | HEART RATE: 87 BPM | OXYGEN SATURATION: 98 % | DIASTOLIC BLOOD PRESSURE: 78 MMHG

## 2020-12-02 DIAGNOSIS — M19.041 OSTEOARTHRITIS OF BOTH HANDS, UNSPECIFIED OSTEOARTHRITIS TYPE: Primary | ICD-10-CM

## 2020-12-02 DIAGNOSIS — M19.042 OSTEOARTHRITIS OF BOTH HANDS, UNSPECIFIED OSTEOARTHRITIS TYPE: Primary | ICD-10-CM

## 2020-12-02 DIAGNOSIS — H65.03 NON-RECURRENT ACUTE SEROUS OTITIS MEDIA OF BOTH EARS: ICD-10-CM

## 2020-12-02 PROCEDURE — 99213 OFFICE O/P EST LOW 20 MIN: CPT | Performed by: NURSE PRACTITIONER

## 2020-12-02 RX ORDER — NEOMYCIN SULFATE, POLYMYXIN B SULFATE AND HYDROCORTISONE 10; 3.5; 1 MG/ML; MG/ML; [USP'U]/ML
3 SUSPENSION/ DROPS AURICULAR (OTIC) 3 TIMES DAILY
Qty: 10 ML | Refills: 0 | Status: SHIPPED | OUTPATIENT
Start: 2020-12-02 | End: 2020-12-07

## 2020-12-02 NOTE — PATIENT INSTRUCTIONS

## 2020-12-02 NOTE — PROGRESS NOTES
Subjective   Cherelle Bourgeois is a 50 y.o. female.   Earache, Left Ear.    History of Present Illness   Started yesterday with sharp stabbing inner ear pains.  She had old benzocaine eardrops that she used which seem to help.  She is also having trouble with bilateral hand pain in the morning.  She was seen earlier by Mere Palmer the rheumatologist who offered her help but she refused.  She was diagnosed with osteoarthritis.  She reports she did not like the office and would like to be referred somewhere else.  All of her labs for this were normal.    The following portions of the patient's history were reviewed and updated as appropriate: allergies, current medications, past family history, past medical history, past social history, past surgical history and problem list.    Review of Systems   Constitutional: Negative for activity change, appetite change, chills and fever.   HENT: Positive for ear pain. Negative for congestion, ear discharge, rhinorrhea and sinus pressure.    Eyes: Negative for pain.   Respiratory: Negative for cough and shortness of breath.    Cardiovascular: Negative for chest pain and leg swelling.   Gastrointestinal: Negative for nausea and vomiting.   Genitourinary: Negative for difficulty urinating.   Musculoskeletal:        Bilateral hand pain   Skin: Negative for rash.   Neurological: Negative for dizziness, facial asymmetry and headache.       Objective   Physical Exam  Vitals signs and nursing note reviewed.   Constitutional:       General: She is not in acute distress.     Appearance: She is well-developed.   HENT:      Head: Normocephalic and atraumatic.      Comments: Her right TM had serous fluid unable to visualize TM     Right Ear: External ear normal.      Left Ear: Tympanic membrane and external ear normal.   Neck:      Musculoskeletal: Neck supple.      Thyroid: No thyromegaly.   Cardiovascular:      Rate and Rhythm: Normal rate and regular rhythm.      Heart sounds: Normal  heart sounds.   Pulmonary:      Effort: Pulmonary effort is normal.      Breath sounds: Normal breath sounds.   Musculoskeletal: Normal range of motion.         General: No swelling or tenderness.   Skin:     General: Skin is warm.   Neurological:      Mental Status: She is alert.           Assessment/Plan   Cherelle was seen today for earache.  Diagnoses and all orders for this visit:  Osteoarthritis of both hands, unspecified osteoarthritis type (Primary)  -     Ambulatory Referral to Rheumatology  Non-recurrent acute serous otitis media of both ears  Other orders  -     neomycin-polymyxin-hydrocortisone (CORTISPORIN) [id number] otic suspension    She requested a muscle relaxer for her hands every morning.  I had a discussion with her about osteoarthritis and treatment that would help her.  I told her putting her on a daily medicine that she did not need was not a good idea.  She asked for another referral to a different rheumatologist, and requested Dr. Galindo.     Cortisporin to bilateral ears for 5 days as discussed  No follow-ups on file.

## 2021-04-16 DIAGNOSIS — Z12.31 ENCOUNTER FOR SCREENING MAMMOGRAM FOR MALIGNANT NEOPLASM OF BREAST: Primary | ICD-10-CM

## 2021-04-19 ENCOUNTER — TELEPHONE (OUTPATIENT)
Dept: FAMILY MEDICINE CLINIC | Facility: CLINIC | Age: 51
End: 2021-04-19

## 2021-04-19 DIAGNOSIS — N63.20 LEFT BREAST LUMP: Primary | ICD-10-CM

## 2021-04-19 NOTE — TELEPHONE ENCOUNTER
Caller: Cherelle Bourgeois    Relationship: Self    Best call back number: [phone]    What orders are you requesting (i.e. lab or imaging): DIGNOSTIC MAMMOGRAM    In what timeframe would the patient need to come in: ASAP    Where will you receive your lab/imaging services: [facility]  PH: [phone]

## 2021-07-27 ENCOUNTER — OFFICE VISIT (OUTPATIENT)
Dept: FAMILY MEDICINE CLINIC | Facility: CLINIC | Age: 51
End: 2021-07-27

## 2021-07-27 VITALS
HEART RATE: 74 BPM | OXYGEN SATURATION: 97 % | BODY MASS INDEX: 28.35 KG/M2 | SYSTOLIC BLOOD PRESSURE: 150 MMHG | RESPIRATION RATE: 16 BRPM | HEIGHT: 63 IN | WEIGHT: 160 LBS | DIASTOLIC BLOOD PRESSURE: 100 MMHG

## 2021-07-27 DIAGNOSIS — B02.9 HERPES ZOSTER WITHOUT COMPLICATION: Primary | ICD-10-CM

## 2021-07-27 PROCEDURE — 99213 OFFICE O/P EST LOW 20 MIN: CPT | Performed by: NURSE PRACTITIONER

## 2021-07-27 RX ORDER — VALACYCLOVIR HYDROCHLORIDE 1 G/1
1000 TABLET, FILM COATED ORAL 3 TIMES DAILY
Qty: 21 TABLET | Refills: 0 | Status: SHIPPED | OUTPATIENT
Start: 2021-07-27 | End: 2021-08-04

## 2021-07-27 NOTE — PROGRESS NOTES
"Subjective   Cherelle Bourgeois is a 50 y.o. female.     History of Present Illness   Patient presents with c/o painful rash to right scapula area that started three days.  She reports pain and blisters. She states that she put a \"CBD salve\" on site without any relief.     The following portions of the patient's history were reviewed and updated as appropriate: allergies, current medications, past family history, past medical history, past social history, past surgical history and problem list.    Review of Systems   Constitutional: Negative for chills, fatigue and fever.   Respiratory: Negative for cough, chest tightness, shortness of breath and wheezing.    Cardiovascular: Negative for chest pain, palpitations and leg swelling.   Musculoskeletal: Negative for arthralgias and joint swelling.   Skin: Positive for rash. Negative for color change, dry skin, pallor, skin lesions and bruise.   Allergic/Immunologic: Negative.    Neurological: Negative for dizziness, weakness and headache.       Objective   Physical Exam  Vitals and nursing note reviewed.   Constitutional:       Appearance: She is well-developed.   HENT:      Head: Normocephalic and atraumatic.   Eyes:      Conjunctiva/sclera: Conjunctivae normal.      Pupils: Pupils are equal, round, and reactive to light.   Cardiovascular:      Rate and Rhythm: Normal rate and regular rhythm.      Heart sounds: Normal heart sounds. No murmur heard.     Pulmonary:      Effort: Pulmonary effort is normal.      Breath sounds: Normal breath sounds.   Musculoskeletal:         General: No deformity.      Cervical back: Normal range of motion and neck supple.   Lymphadenopathy:      Cervical: No cervical adenopathy.   Skin:     General: Skin is warm and dry.      Findings: Erythema and rash present. No lesion.      Comments: Raised clustered rash with blistering, erythematous and raised to right upper scapula.    Neurological:      Mental Status: She is alert and oriented to " person, place, and time.   Psychiatric:         Behavior: Behavior normal.         Thought Content: Thought content normal.         Judgment: Judgment normal.         Vitals:    07/27/21 1009   BP: 150/100   Pulse: 74   Resp: 16   SpO2: 97%     Body mass index is 28.35 kg/m².    Procedures    Assessment/Plan   Problems Addressed this Visit     None      Visit Diagnoses     Herpes zoster without complication    -  Primary    Relevant Medications    valACYclovir (Valtrex) 1000 MG tablet      Diagnoses       Codes Comments    Herpes zoster without complication    -  Primary ICD-10-CM: B02.9  ICD-9-CM: 053.9         Valcyclovir 1G 1 p.o. TID X 7 days       Return if symptoms worsen or fail to improve.       Patient Instructions   Return if symptoms worsen or fail to improve.      Shingles    Shingles, which is also known as herpes zoster, is an infection that causes a painful skin rash and fluid-filled blisters. It is caused by a virus.  Shingles only develops in people who:  · Have had chickenpox.  · Have been given a medicine to protect against chickenpox (have been vaccinated). Shingles is rare in this group.  What are the causes?  Shingles is caused by varicella-zoster virus (VZV). This is the same virus that causes chickenpox. After a person is exposed to VZV, the virus stays in the body in an inactive (dormant) state. Shingles develops if the virus is reactivated. This can happen many years after the first (initial) exposure to VZV. It is not known what causes this virus to be reactivated.  What increases the risk?  People who have had chickenpox or received the chickenpox vaccine are at risk for shingles. Shingles infection is more common in people who:  · Are older than age 60.  · Have a weakened disease-fighting system (immune system), such as people with:  ? HIV.  ? AIDS.  ? Cancer.  · Are taking medicines that weaken the immune system, such as transplant medicines.  · Are experiencing a lot of stress.  What  are the signs or symptoms?  Early symptoms of this condition include itching, tingling, and pain in an area on your skin. Pain may be described as burning, stabbing, or throbbing.  A few days or weeks after early symptoms start, a painful red rash appears. The rash is usually on one side of the body and has a band-like or belt-like pattern. The rash eventually turns into fluid-filled blisters that break open, change into scabs, and dry up in about 2-3 weeks.  At any time during the infection, you may also develop:  · A fever.  · Chills.  · A headache.  · An upset stomach.  How is this diagnosed?  This condition is diagnosed with a skin exam. Skin or fluid samples may be taken from the blisters before a diagnosis is made. These samples are examined under a microscope or sent to a lab for testing.  How is this treated?  The rash may last for several weeks. There is not a specific cure for this condition. Your health care provider will probably prescribe medicines to help you manage pain, recover more quickly, and avoid long-term problems. Medicines may include:  · Antiviral drugs.  · Anti-inflammatory drugs.  · Pain medicines.  · Anti-itching medicines (antihistamines).  If the area involved is on your face, you may be referred to a specialist, such as an eye doctor (ophthalmologist) or an ear, nose, and throat (ENT) doctor (otolaryngologist) to help you avoid eye problems, chronic pain, or disability.  Follow these instructions at home:  Medicines  · Take over-the-counter and prescription medicines only as told by your health care provider.  · Apply an anti-itch cream or numbing cream to the affected area as told by your health care provider.  Relieving itching and discomfort    · Apply cold, wet cloths (cold compresses) to the area of the rash or blisters as told by your health care provider.  · Cool baths can be soothing. Try adding baking soda or dry oatmeal to the water to reduce itching. Do not bathe in hot  water.  Blister and rash care  · Keep your rash covered with a loose bandage (dressing). Wear loose-fitting clothing to help ease the pain of material rubbing against the rash.  · Keep your rash and blisters clean by washing the area with mild soap and cool water as told by your health care provider.  · Check your rash every day for signs of infection. Check for:  ? More redness, swelling, or pain.  ? Fluid or blood.  ? Warmth.  ? Pus or a bad smell.  · Do not scratch your rash or pick at your blisters. To help avoid scratching:  ? Keep your fingernails clean and cut short.  ? Wear gloves or mittens while you sleep, if scratching is a problem.  General instructions  · Rest as told by your health care provider.  · Keep all follow-up visits as told by your health care provider. This is important.  · Wash your hands often with soap and water. If soap and water are not available, use hand . Doing this lowers your chance of getting a bacterial skin infection.  · Before your blisters change into scabs, your shingles infection can cause chickenpox in people who have never had it or have never been vaccinated against it. To prevent this from happening, avoid contact with other people, especially:  ? Babies.  ? Pregnant women.  ? Children who have eczema.  ? Elderly people who have transplants.  ? People who have chronic illnesses, such as cancer or AIDS.  Contact a health care provider if:  · Your pain is not relieved with prescribed medicines.  · Your pain does not get better after the rash heals.  · You have signs of infection in the rash area, such as:  ? More redness, swelling, or pain around the rash.  ? Fluid or blood coming from the rash.  ? The rash area feeling warm to the touch.  ? Pus or a bad smell coming from the rash.  Get help right away if:  · The rash is on your face or nose.  · You have facial pain, pain around your eye area, or loss of feeling on one side of your face.  · You have difficulty  seeing.  · You have ear pain or have ringing in your ear.  · You have a loss of taste.  · Your condition gets worse.  Summary  · Shingles, which is also known as herpes zoster, is an infection that causes a painful skin rash and fluid-filled blisters.  · This condition is diagnosed with a skin exam. Skin or fluid samples may be taken from the blisters and examined before the diagnosis is made.  · Keep your rash covered with a loose bandage (dressing). Wear loose-fitting clothing to help ease the pain of material rubbing against the rash.  · Before your blisters change into scabs, your shingles infection can cause chickenpox in people who have never had it or have never been vaccinated against it.  This information is not intended to replace advice given to you by your health care provider. Make sure you discuss any questions you have with your health care provider.  Document Revised: 04/10/2020 Document Reviewed: 08/22/2018  [employer] Patient Education © 2021 [employer]

## 2021-07-27 NOTE — PATIENT INSTRUCTIONS
Return if symptoms worsen or fail to improve.      Shingles    Shingles, which is also known as herpes zoster, is an infection that causes a painful skin rash and fluid-filled blisters. It is caused by a virus.  Shingles only develops in people who:  · Have had chickenpox.  · Have been given a medicine to protect against chickenpox (have been vaccinated). Shingles is rare in this group.  What are the causes?  Shingles is caused by varicella-zoster virus (VZV). This is the same virus that causes chickenpox. After a person is exposed to VZV, the virus stays in the body in an inactive (dormant) state. Shingles develops if the virus is reactivated. This can happen many years after the first (initial) exposure to VZV. It is not known what causes this virus to be reactivated.  What increases the risk?  People who have had chickenpox or received the chickenpox vaccine are at risk for shingles. Shingles infection is more common in people who:  · Are older than age 60.  · Have a weakened disease-fighting system (immune system), such as people with:  ? HIV.  ? AIDS.  ? Cancer.  · Are taking medicines that weaken the immune system, such as transplant medicines.  · Are experiencing a lot of stress.  What are the signs or symptoms?  Early symptoms of this condition include itching, tingling, and pain in an area on your skin. Pain may be described as burning, stabbing, or throbbing.  A few days or weeks after early symptoms start, a painful red rash appears. The rash is usually on one side of the body and has a band-like or belt-like pattern. The rash eventually turns into fluid-filled blisters that break open, change into scabs, and dry up in about 2-3 weeks.  At any time during the infection, you may also develop:  · A fever.  · Chills.  · A headache.  · An upset stomach.  How is this diagnosed?  This condition is diagnosed with a skin exam. Skin or fluid samples may be taken from the blisters before a diagnosis is made. These  samples are examined under a microscope or sent to a lab for testing.  How is this treated?  The rash may last for several weeks. There is not a specific cure for this condition. Your health care provider will probably prescribe medicines to help you manage pain, recover more quickly, and avoid long-term problems. Medicines may include:  · Antiviral drugs.  · Anti-inflammatory drugs.  · Pain medicines.  · Anti-itching medicines (antihistamines).  If the area involved is on your face, you may be referred to a specialist, such as an eye doctor (ophthalmologist) or an ear, nose, and throat (ENT) doctor (otolaryngologist) to help you avoid eye problems, chronic pain, or disability.  Follow these instructions at home:  Medicines  · Take over-the-counter and prescription medicines only as told by your health care provider.  · Apply an anti-itch cream or numbing cream to the affected area as told by your health care provider.  Relieving itching and discomfort    · Apply cold, wet cloths (cold compresses) to the area of the rash or blisters as told by your health care provider.  · Cool baths can be soothing. Try adding baking soda or dry oatmeal to the water to reduce itching. Do not bathe in hot water.  Blister and rash care  · Keep your rash covered with a loose bandage (dressing). Wear loose-fitting clothing to help ease the pain of material rubbing against the rash.  · Keep your rash and blisters clean by washing the area with mild soap and cool water as told by your health care provider.  · Check your rash every day for signs of infection. Check for:  ? More redness, swelling, or pain.  ? Fluid or blood.  ? Warmth.  ? Pus or a bad smell.  · Do not scratch your rash or pick at your blisters. To help avoid scratching:  ? Keep your fingernails clean and cut short.  ? Wear gloves or mittens while you sleep, if scratching is a problem.  General instructions  · Rest as told by your health care provider.  · Keep all follow-up  visits as told by your health care provider. This is important.  · Wash your hands often with soap and water. If soap and water are not available, use hand . Doing this lowers your chance of getting a bacterial skin infection.  · Before your blisters change into scabs, your shingles infection can cause chickenpox in people who have never had it or have never been vaccinated against it. To prevent this from happening, avoid contact with other people, especially:  ? Babies.  ? Pregnant women.  ? Children who have eczema.  ? Elderly people who have transplants.  ? People who have chronic illnesses, such as cancer or AIDS.  Contact a health care provider if:  · Your pain is not relieved with prescribed medicines.  · Your pain does not get better after the rash heals.  · You have signs of infection in the rash area, such as:  ? More redness, swelling, or pain around the rash.  ? Fluid or blood coming from the rash.  ? The rash area feeling warm to the touch.  ? Pus or a bad smell coming from the rash.  Get help right away if:  · The rash is on your face or nose.  · You have facial pain, pain around your eye area, or loss of feeling on one side of your face.  · You have difficulty seeing.  · You have ear pain or have ringing in your ear.  · You have a loss of taste.  · Your condition gets worse.  Summary  · Shingles, which is also known as herpes zoster, is an infection that causes a painful skin rash and fluid-filled blisters.  · This condition is diagnosed with a skin exam. Skin or fluid samples may be taken from the blisters and examined before the diagnosis is made.  · Keep your rash covered with a loose bandage (dressing). Wear loose-fitting clothing to help ease the pain of material rubbing against the rash.  · Before your blisters change into scabs, your shingles infection can cause chickenpox in people who have never had it or have never been vaccinated against it.  This information is not intended to  replace advice given to you by your health care provider. Make sure you discuss any questions you have with your health care provider.  Document Revised: 04/10/2020 Document Reviewed: 08/22/2018  [employer] Patient Education © 2021 [employer]

## 2021-08-02 ENCOUNTER — TELEPHONE (OUTPATIENT)
Dept: FAMILY MEDICINE CLINIC | Facility: CLINIC | Age: 51
End: 2021-08-02

## 2021-08-02 NOTE — TELEPHONE ENCOUNTER
PT CALLED STATING THE SHINGLES IS GETTING WORSE, SHE IS REQUESTING A CALL BACK TO SEE WHAT CAN HELP WITH THE ITCH.

## 2021-08-02 NOTE — TELEPHONE ENCOUNTER
Dr. Wilson this pt is about out of medication.  Seen July 27th.    Pain has gotten worse.  She is taken ibuprofen currently.  Rash has spread.    Any suggestions?

## 2021-08-03 ENCOUNTER — TELEPHONE (OUTPATIENT)
Dept: FAMILY MEDICINE CLINIC | Facility: CLINIC | Age: 51
End: 2021-08-03

## 2021-08-03 NOTE — TELEPHONE ENCOUNTER
DELETE AFTER REVIEWING: Telephone encounter to be sent to the  pool     Caller: FrancoiseusebioPablo    Relationship: Self    Best call back number: [phone]   What is the best time to reach you: ASAP  Who are you requesting to speak with (clinical staff, provider,  specific staff member): CLINICAL    Do you know the name of the person who called: PABLO     What was the call regarding: SHINGLES HAS SPREAD TO THE OTHER SIDE OF BODY. WHAT SHOULD SHE DO TO TAKE CARE OF IT?     Do you require a callback: YES

## 2021-08-03 NOTE — TELEPHONE ENCOUNTER
Nuvia I need your help on this now.    It's spreading.    Only taking ibuprofen at this point. The valtrex would run out today.

## 2021-08-04 ENCOUNTER — OFFICE VISIT (OUTPATIENT)
Dept: FAMILY MEDICINE CLINIC | Facility: CLINIC | Age: 51
End: 2021-08-04

## 2021-08-04 VITALS
RESPIRATION RATE: 14 BRPM | HEIGHT: 63 IN | SYSTOLIC BLOOD PRESSURE: 144 MMHG | WEIGHT: 160 LBS | HEART RATE: 66 BPM | OXYGEN SATURATION: 99 % | BODY MASS INDEX: 28.35 KG/M2 | DIASTOLIC BLOOD PRESSURE: 94 MMHG

## 2021-08-04 DIAGNOSIS — B02.9 HERPES ZOSTER WITHOUT COMPLICATION: Primary | ICD-10-CM

## 2021-08-04 DIAGNOSIS — R21 RASH AND NONSPECIFIC SKIN ERUPTION: ICD-10-CM

## 2021-08-04 PROCEDURE — 99213 OFFICE O/P EST LOW 20 MIN: CPT | Performed by: NURSE PRACTITIONER

## 2021-08-04 RX ORDER — TRIAMCINOLONE ACETONIDE 1 MG/G
CREAM TOPICAL 2 TIMES DAILY
Qty: 15 G | Refills: 0 | Status: SHIPPED | OUTPATIENT
Start: 2021-08-04 | End: 2021-08-11

## 2021-08-04 NOTE — PATIENT INSTRUCTIONS
Shingles    Shingles, which is also known as herpes zoster, is an infection that causes a painful skin rash and fluid-filled blisters. It is caused by a virus.  Shingles only develops in people who:  · Have had chickenpox.  · Have been given a medicine to protect against chickenpox (have been vaccinated). Shingles is rare in this group.  What are the causes?  Shingles is caused by varicella-zoster virus (VZV). This is the same virus that causes chickenpox. After a person is exposed to VZV, the virus stays in the body in an inactive (dormant) state. Shingles develops if the virus is reactivated. This can happen many years after the first (initial) exposure to VZV. It is not known what causes this virus to be reactivated.  What increases the risk?  People who have had chickenpox or received the chickenpox vaccine are at risk for shingles. Shingles infection is more common in people who:  · Are older than age 60.  · Have a weakened disease-fighting system (immune system), such as people with:  ? HIV.  ? AIDS.  ? Cancer.  · Are taking medicines that weaken the immune system, such as transplant medicines.  · Are experiencing a lot of stress.  What are the signs or symptoms?  Early symptoms of this condition include itching, tingling, and pain in an area on your skin. Pain may be described as burning, stabbing, or throbbing.  A few days or weeks after early symptoms start, a painful red rash appears. The rash is usually on one side of the body and has a band-like or belt-like pattern. The rash eventually turns into fluid-filled blisters that break open, change into scabs, and dry up in about 2-3 weeks.  At any time during the infection, you may also develop:  · A fever.  · Chills.  · A headache.  · An upset stomach.  How is this diagnosed?  This condition is diagnosed with a skin exam. Skin or fluid samples may be taken from the blisters before a diagnosis is made. These samples are examined under a microscope or sent to  a lab for testing.  How is this treated?  The rash may last for several weeks. There is not a specific cure for this condition. Your health care provider will probably prescribe medicines to help you manage pain, recover more quickly, and avoid long-term problems. Medicines may include:  · Antiviral drugs.  · Anti-inflammatory drugs.  · Pain medicines.  · Anti-itching medicines (antihistamines).  If the area involved is on your face, you may be referred to a specialist, such as an eye doctor (ophthalmologist) or an ear, nose, and throat (ENT) doctor (otolaryngologist) to help you avoid eye problems, chronic pain, or disability.  Follow these instructions at home:  Medicines  · Take over-the-counter and prescription medicines only as told by your health care provider.  · Apply an anti-itch cream or numbing cream to the affected area as told by your health care provider.  Relieving itching and discomfort    · Apply cold, wet cloths (cold compresses) to the area of the rash or blisters as told by your health care provider.  · Cool baths can be soothing. Try adding baking soda or dry oatmeal to the water to reduce itching. Do not bathe in hot water.  Blister and rash care  · Keep your rash covered with a loose bandage (dressing). Wear loose-fitting clothing to help ease the pain of material rubbing against the rash.  · Keep your rash and blisters clean by washing the area with mild soap and cool water as told by your health care provider.  · Check your rash every day for signs of infection. Check for:  ? More redness, swelling, or pain.  ? Fluid or blood.  ? Warmth.  ? Pus or a bad smell.  · Do not scratch your rash or pick at your blisters. To help avoid scratching:  ? Keep your fingernails clean and cut short.  ? Wear gloves or mittens while you sleep, if scratching is a problem.  General instructions  · Rest as told by your health care provider.  · Keep all follow-up visits as told by your health care provider. This  is important.  · Wash your hands often with soap and water. If soap and water are not available, use hand . Doing this lowers your chance of getting a bacterial skin infection.  · Before your blisters change into scabs, your shingles infection can cause chickenpox in people who have never had it or have never been vaccinated against it. To prevent this from happening, avoid contact with other people, especially:  ? Babies.  ? Pregnant women.  ? Children who have eczema.  ? Elderly people who have transplants.  ? People who have chronic illnesses, such as cancer or AIDS.  Contact a health care provider if:  · Your pain is not relieved with prescribed medicines.  · Your pain does not get better after the rash heals.  · You have signs of infection in the rash area, such as:  ? More redness, swelling, or pain around the rash.  ? Fluid or blood coming from the rash.  ? The rash area feeling warm to the touch.  ? Pus or a bad smell coming from the rash.  Get help right away if:  · The rash is on your face or nose.  · You have facial pain, pain around your eye area, or loss of feeling on one side of your face.  · You have difficulty seeing.  · You have ear pain or have ringing in your ear.  · You have a loss of taste.  · Your condition gets worse.  Summary  · Shingles, which is also known as herpes zoster, is an infection that causes a painful skin rash and fluid-filled blisters.  · This condition is diagnosed with a skin exam. Skin or fluid samples may be taken from the blisters and examined before the diagnosis is made.  · Keep your rash covered with a loose bandage (dressing). Wear loose-fitting clothing to help ease the pain of material rubbing against the rash.  · Before your blisters change into scabs, your shingles infection can cause chickenpox in people who have never had it or have never been vaccinated against it.  This information is not intended to replace advice given to you by your health care  provider. Make sure you discuss any questions you have with your health care provider.  Document Revised: 04/10/2020 Document Reviewed: 08/22/2018  [employer] Patient Education © 2021 [employer]

## 2021-08-04 NOTE — PROGRESS NOTES
Subjective   Cherelle Bourgeois is a 50 y.o. female.   Rash    History of Present Illness   Was seen last week for right shoulder pain and treated with valtrex,then she broke out in a couple more She reports that they are painful and pruritic.She has been using some cream for mosquito bites but did not apply any to the areas.    The following portions of the patient's history were reviewed and updated as appropriate: allergies, current medications, past family history, past medical history, past social history, past surgical history and problem list.    Review of Systems   Constitutional: Negative for activity change and appetite change.   Skin: Positive for rash.       Objective   Physical Exam  Vitals and nursing note reviewed.   Constitutional:       Appearance: She is well-developed.   HENT:      Head: Normocephalic and atraumatic.   Eyes:      Pupils: Pupils are equal, round, and reactive to light.   Pulmonary:      Effort: Pulmonary effort is normal.   Musculoskeletal:         General: Normal range of motion.   Skin:     General: Skin is warm and dry.      Findings: Rash present.      Comments: 2 areas of erythema to her right shoulder, lower left back and upper left side   Neurological:      Mental Status: She is alert and oriented to person, place, and time.           Assessment/Plan   Problem List Items Addressed This Visit     None      Visit Diagnoses     Herpes zoster without complication    -  Primary    Rash and nonspecific skin eruption        Relevant Medications    triamcinolone (KENALOG) 0.1 % cream        Triamcinolone cream to all areas bid for 7 days.  May take otc ibuprofen for the pain if needed.       Return if symptoms worsen or fail to improve.

## 2021-08-09 ENCOUNTER — OFFICE VISIT (OUTPATIENT)
Dept: FAMILY MEDICINE CLINIC | Facility: CLINIC | Age: 51
End: 2021-08-09

## 2021-08-09 VITALS
BODY MASS INDEX: 28 KG/M2 | HEART RATE: 81 BPM | OXYGEN SATURATION: 98 % | SYSTOLIC BLOOD PRESSURE: 148 MMHG | RESPIRATION RATE: 16 BRPM | WEIGHT: 158 LBS | HEIGHT: 63 IN | DIASTOLIC BLOOD PRESSURE: 102 MMHG

## 2021-08-09 DIAGNOSIS — S50.861D INSECT BITE OF RIGHT FOREARM, SUBSEQUENT ENCOUNTER: ICD-10-CM
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
## 2021-08-09 DIAGNOSIS — B02.9 HERPES ZOSTER WITHOUT COMPLICATION: Primary | ICD-10-CM

## 2021-08-09 PROCEDURE — 99213 OFFICE O/P EST LOW 20 MIN: CPT | Performed by: NURSE PRACTITIONER

## 2021-08-09 NOTE — PROGRESS NOTES
Subjective  Answers for HPI/ROS submitted by the patient on 8/9/2021  What is the primary reason for your visit?: Rash      Cherelle Bourgeois is a 50 y.o. female.     History of Present Illness   Patient presents for follow-up for shingles. She reports that she had a bump on her right lip. She states that she was worried that she had shingle on her face. She reports that she finished all of the valcyclovir and states that the shingles have improved.     The following portions of the patient's history were reviewed and updated as appropriate: allergies, current medications, past family history, past medical history, past social history, past surgical history and problem list.    Review of Systems   Constitutional: Negative for chills, fatigue and fever.   Respiratory: Negative for cough, chest tightness, shortness of breath and wheezing.    Cardiovascular: Negative for chest pain, palpitations and leg swelling.   Musculoskeletal: Negative for arthralgias and joint swelling.   Skin: Positive for rash and skin lesions. Negative for color change, dry skin, pallor and bruise.   Allergic/Immunologic: Negative.    Neurological: Negative for dizziness, weakness and headache.       Objective   Physical Exam  Vitals and nursing note reviewed.   Constitutional:       Appearance: She is well-developed.   HENT:      Head: Normocephalic and atraumatic.   Eyes:      Conjunctiva/sclera: Conjunctivae normal.      Pupils: Pupils are equal, round, and reactive to light.   Cardiovascular:      Rate and Rhythm: Normal rate and regular rhythm.      Heart sounds: Normal heart sounds. No murmur heard.     Pulmonary:      Effort: Pulmonary effort is normal.      Breath sounds: Normal breath sounds.   Musculoskeletal:         General: No deformity.      Cervical back: Normal range of motion and neck supple.   Lymphadenopathy:      Cervical: No cervical adenopathy.   Skin:     General: Skin is warm and dry.      Findings: Erythema, lesion and  rash present.      Comments: Small papular bump to right upper lip. Shingles rash to right upper scapula has healed nicely and has mild scabbing to site.    Neurological:      Mental Status: She is alert and oriented to person, place, and time.   Psychiatric:         Behavior: Behavior normal.         Thought Content: Thought content normal.         Judgment: Judgment normal.         Vitals:    08/09/21 1355   BP: (!) 148/102   Pulse: 81   Resp: 16   SpO2: 98%     Body mass index is 28 kg/m².    Procedures    Assessment/Plan   Problems Addressed this Visit     None      Visit Diagnoses     Herpes zoster without complication    -  Primary    Insect bite of right forearm, subsequent encounter          Diagnoses       Codes Comments    Herpes zoster without complication    -  Primary ICD-10-CM: B02.9  ICD-9-CM: 053.9     Insect bite of right forearm, subsequent encounter     ICD-10-CM: S50.861D, W57.XXXD  ICD-9-CM: V58.89, 913.4         Continue cortisone cream for bug bites.   BP at visit is elevated. Patient reports that she is nervous.   Monitor blood pressure at home and call if consistently over 140/90.          Return if symptoms worsen or fail to improve.       Patient Instructions   Return if symptoms worsen or fail to improve.  Call with any questions or concerns.   Monitor blood pressure at home and call if consistently over 140/90.   Continue cortisone cream for bug bites.     Shingles    Shingles, which is also known as herpes zoster, is an infection that causes a painful skin rash and fluid-filled blisters. It is caused by a virus.  Shingles only develops in people who:  · Have had chickenpox.  · Have been given a medicine to protect against chickenpox (have been vaccinated). Shingles is rare in this group.  What are the causes?  Shingles is caused by varicella-zoster virus (VZV). This is the same virus that causes chickenpox. After a person is exposed to VZV, the virus stays in the body in an inactive  (dormant) state. Shingles develops if the virus is reactivated. This can happen many years after the first (initial) exposure to VZV. It is not known what causes this virus to be reactivated.  What increases the risk?  People who have had chickenpox or received the chickenpox vaccine are at risk for shingles. Shingles infection is more common in people who:  · Are older than age 60.  · Have a weakened disease-fighting system (immune system), such as people with:  ? HIV.  ? AIDS.  ? Cancer.  · Are taking medicines that weaken the immune system, such as transplant medicines.  · Are experiencing a lot of stress.  What are the signs or symptoms?  Early symptoms of this condition include itching, tingling, and pain in an area on your skin. Pain may be described as burning, stabbing, or throbbing.  A few days or weeks after early symptoms start, a painful red rash appears. The rash is usually on one side of the body and has a band-like or belt-like pattern. The rash eventually turns into fluid-filled blisters that break open, change into scabs, and dry up in about 2-3 weeks.  At any time during the infection, you may also develop:  · A fever.  · Chills.  · A headache.  · An upset stomach.  How is this diagnosed?  This condition is diagnosed with a skin exam. Skin or fluid samples may be taken from the blisters before a diagnosis is made. These samples are examined under a microscope or sent to a lab for testing.  How is this treated?  The rash may last for several weeks. There is not a specific cure for this condition. Your health care provider will probably prescribe medicines to help you manage pain, recover more quickly, and avoid long-term problems. Medicines may include:  · Antiviral drugs.  · Anti-inflammatory drugs.  · Pain medicines.  · Anti-itching medicines (antihistamines).  If the area involved is on your face, you may be referred to a specialist, such as an eye doctor (ophthalmologist) or an ear, nose, and  throat (ENT) doctor (otolaryngologist) to help you avoid eye problems, chronic pain, or disability.  Follow these instructions at home:  Medicines  · Take over-the-counter and prescription medicines only as told by your health care provider.  · Apply an anti-itch cream or numbing cream to the affected area as told by your health care provider.  Relieving itching and discomfort    · Apply cold, wet cloths (cold compresses) to the area of the rash or blisters as told by your health care provider.  · Cool baths can be soothing. Try adding baking soda or dry oatmeal to the water to reduce itching. Do not bathe in hot water.  Blister and rash care  · Keep your rash covered with a loose bandage (dressing). Wear loose-fitting clothing to help ease the pain of material rubbing against the rash.  · Keep your rash and blisters clean by washing the area with mild soap and cool water as told by your health care provider.  · Check your rash every day for signs of infection. Check for:  ? More redness, swelling, or pain.  ? Fluid or blood.  ? Warmth.  ? Pus or a bad smell.  · Do not scratch your rash or pick at your blisters. To help avoid scratching:  ? Keep your fingernails clean and cut short.  ? Wear gloves or mittens while you sleep, if scratching is a problem.  General instructions  · Rest as told by your health care provider.  · Keep all follow-up visits as told by your health care provider. This is important.  · Wash your hands often with soap and water. If soap and water are not available, use hand . Doing this lowers your chance of getting a bacterial skin infection.  · Before your blisters change into scabs, your shingles infection can cause chickenpox in people who have never had it or have never been vaccinated against it. To prevent this from happening, avoid contact with other people, especially:  ? Babies.  ? Pregnant women.  ? Children who have eczema.  ? Elderly people who have transplants.  ? People  who have chronic illnesses, such as cancer or AIDS.  Contact a health care provider if:  · Your pain is not relieved with prescribed medicines.  · Your pain does not get better after the rash heals.  · You have signs of infection in the rash area, such as:  ? More redness, swelling, or pain around the rash.  ? Fluid or blood coming from the rash.  ? The rash area feeling warm to the touch.  ? Pus or a bad smell coming from the rash.  Get help right away if:  · The rash is on your face or nose.  · You have facial pain, pain around your eye area, or loss of feeling on one side of your face.  · You have difficulty seeing.  · You have ear pain or have ringing in your ear.  · You have a loss of taste.  · Your condition gets worse.  Summary  · Shingles, which is also known as herpes zoster, is an infection that causes a painful skin rash and fluid-filled blisters.  · This condition is diagnosed with a skin exam. Skin or fluid samples may be taken from the blisters and examined before the diagnosis is made.  · Keep your rash covered with a loose bandage (dressing). Wear loose-fitting clothing to help ease the pain of material rubbing against the rash.  · Before your blisters change into scabs, your shingles infection can cause chickenpox in people who have never had it or have never been vaccinated against it.  This information is not intended to replace advice given to you by your health care provider. Make sure you discuss any questions you have with your health care provider.  Document Revised: 04/10/2020 Document Reviewed: 08/22/2018  [employer] Patient Education © 2021 [employer]

## 2021-08-09 NOTE — PATIENT INSTRUCTIONS
Return if symptoms worsen or fail to improve.  Call with any questions or concerns.   Monitor blood pressure at home and call if consistently over 140/90.   Continue cortisone cream for bug bites.     Shingles    Shingles, which is also known as herpes zoster, is an infection that causes a painful skin rash and fluid-filled blisters. It is caused by a virus.  Shingles only develops in people who:  · Have had chickenpox.  · Have been given a medicine to protect against chickenpox (have been vaccinated). Shingles is rare in this group.  What are the causes?  Shingles is caused by varicella-zoster virus (VZV). This is the same virus that causes chickenpox. After a person is exposed to VZV, the virus stays in the body in an inactive (dormant) state. Shingles develops if the virus is reactivated. This can happen many years after the first (initial) exposure to VZV. It is not known what causes this virus to be reactivated.  What increases the risk?  People who have had chickenpox or received the chickenpox vaccine are at risk for shingles. Shingles infection is more common in people who:  · Are older than age 60.  · Have a weakened disease-fighting system (immune system), such as people with:  ? HIV.  ? AIDS.  ? Cancer.  · Are taking medicines that weaken the immune system, such as transplant medicines.  · Are experiencing a lot of stress.  What are the signs or symptoms?  Early symptoms of this condition include itching, tingling, and pain in an area on your skin. Pain may be described as burning, stabbing, or throbbing.  A few days or weeks after early symptoms start, a painful red rash appears. The rash is usually on one side of the body and has a band-like or belt-like pattern. The rash eventually turns into fluid-filled blisters that break open, change into scabs, and dry up in about 2-3 weeks.  At any time during the infection, you may also develop:  · A fever.  · Chills.  · A headache.  · An upset stomach.  How is  this diagnosed?  This condition is diagnosed with a skin exam. Skin or fluid samples may be taken from the blisters before a diagnosis is made. These samples are examined under a microscope or sent to a lab for testing.  How is this treated?  The rash may last for several weeks. There is not a specific cure for this condition. Your health care provider will probably prescribe medicines to help you manage pain, recover more quickly, and avoid long-term problems. Medicines may include:  · Antiviral drugs.  · Anti-inflammatory drugs.  · Pain medicines.  · Anti-itching medicines (antihistamines).  If the area involved is on your face, you may be referred to a specialist, such as an eye doctor (ophthalmologist) or an ear, nose, and throat (ENT) doctor (otolaryngologist) to help you avoid eye problems, chronic pain, or disability.  Follow these instructions at home:  Medicines  · Take over-the-counter and prescription medicines only as told by your health care provider.  · Apply an anti-itch cream or numbing cream to the affected area as told by your health care provider.  Relieving itching and discomfort    · Apply cold, wet cloths (cold compresses) to the area of the rash or blisters as told by your health care provider.  · Cool baths can be soothing. Try adding baking soda or dry oatmeal to the water to reduce itching. Do not bathe in hot water.  Blister and rash care  · Keep your rash covered with a loose bandage (dressing). Wear loose-fitting clothing to help ease the pain of material rubbing against the rash.  · Keep your rash and blisters clean by washing the area with mild soap and cool water as told by your health care provider.  · Check your rash every day for signs of infection. Check for:  ? More redness, swelling, or pain.  ? Fluid or blood.  ? Warmth.  ? Pus or a bad smell.  · Do not scratch your rash or pick at your blisters. To help avoid scratching:  ? Keep your fingernails clean and cut short.  ? Wear  gloves or mittens while you sleep, if scratching is a problem.  General instructions  · Rest as told by your health care provider.  · Keep all follow-up visits as told by your health care provider. This is important.  · Wash your hands often with soap and water. If soap and water are not available, use hand . Doing this lowers your chance of getting a bacterial skin infection.  · Before your blisters change into scabs, your shingles infection can cause chickenpox in people who have never had it or have never been vaccinated against it. To prevent this from happening, avoid contact with other people, especially:  ? Babies.  ? Pregnant women.  ? Children who have eczema.  ? Elderly people who have transplants.  ? People who have chronic illnesses, such as cancer or AIDS.  Contact a health care provider if:  · Your pain is not relieved with prescribed medicines.  · Your pain does not get better after the rash heals.  · You have signs of infection in the rash area, such as:  ? More redness, swelling, or pain around the rash.  ? Fluid or blood coming from the rash.  ? The rash area feeling warm to the touch.  ? Pus or a bad smell coming from the rash.  Get help right away if:  · The rash is on your face or nose.  · You have facial pain, pain around your eye area, or loss of feeling on one side of your face.  · You have difficulty seeing.  · You have ear pain or have ringing in your ear.  · You have a loss of taste.  · Your condition gets worse.  Summary  · Shingles, which is also known as herpes zoster, is an infection that causes a painful skin rash and fluid-filled blisters.  · This condition is diagnosed with a skin exam. Skin or fluid samples may be taken from the blisters and examined before the diagnosis is made.  · Keep your rash covered with a loose bandage (dressing). Wear loose-fitting clothing to help ease the pain of material rubbing against the rash.  · Before your blisters change into scabs, your  shingles infection can cause chickenpox in people who have never had it or have never been vaccinated against it.  This information is not intended to replace advice given to you by your health care provider. Make sure you discuss any questions you have with your health care provider.  Document Revised: 04/10/2020 Document Reviewed: 08/22/2018  [employer] Patient Education © 2021 [employer]

## 2021-09-04 DIAGNOSIS — B02.9 HERPES ZOSTER WITHOUT COMPLICATION: Primary | ICD-10-CM

## 2021-09-04 RX ORDER — VALACYCLOVIR HYDROCHLORIDE 1 G/1
1000 TABLET, FILM COATED ORAL 3 TIMES DAILY
Qty: 21 TABLET | Refills: 0 | Status: SHIPPED | OUTPATIENT
Start: 2021-09-04 | End: 2021-09-11

## 2021-09-10 ENCOUNTER — OFFICE VISIT (OUTPATIENT)
Dept: FAMILY MEDICINE CLINIC | Facility: CLINIC | Age: 51
End: 2021-09-10

## 2021-09-10 VITALS
OXYGEN SATURATION: 99 % | BODY MASS INDEX: 29.15 KG/M2 | WEIGHT: 158.4 LBS | HEART RATE: 77 BPM | RESPIRATION RATE: 16 BRPM | HEIGHT: 62 IN | DIASTOLIC BLOOD PRESSURE: 80 MMHG | SYSTOLIC BLOOD PRESSURE: 120 MMHG

## 2021-09-10 DIAGNOSIS — H66.90 ACUTE OTITIS MEDIA, UNSPECIFIED OTITIS MEDIA TYPE: Primary | ICD-10-CM

## 2021-09-10 PROCEDURE — 99213 OFFICE O/P EST LOW 20 MIN: CPT | Performed by: NURSE PRACTITIONER

## 2021-09-10 RX ORDER — AZITHROMYCIN 250 MG/1
TABLET, FILM COATED ORAL
Qty: 6 TABLET | Refills: 0 | Status: SHIPPED | OUTPATIENT
Start: 2021-09-10 | End: 2021-10-12

## 2021-09-10 NOTE — PATIENT INSTRUCTIONS
I am prescribing you an antibiotic, it is important that you take all of this medication even if you are feeling better.  May use over the counter zyrtec for postnasal drip.  Delsym for cough.   Return if symptoms worsen or fail to improve.       Otitis Media, Adult    Otitis media occurs when there is inflammation and fluid in the middle ear space with signs and symptoms of an acute infection. The middle ear is a part of the ear that contains bones for hearing as well as air that helps send sounds to the brain. When infected fluid builds up in this space, it causes pressure and results in symptoms of acute otitis media. The eustachian tube connects the middle ear to the back of the nose (nasopharynx) and normally allows air into the middle ear space. If the eustachian tube becomes blocked, fluid can build up and become infected.  What are the causes?  This condition is caused by a blockage in the eustachian tube. This can be caused by an object like mucus, or by swelling (edema) of the tube. Problems that can cause a blockage include:  · A cold or other upper respiratory infection.  · Allergies.  · An irritant, such as tobacco smoke.  · Enlarged adenoids. The adenoids are areas of soft tissue located high in the back of the throat, behind the nose and the roof of the mouth. They are part of the body's defense system (immune system).  · A mass in the nasopharynx.  · Damage to the ear caused by pressure changes (barotrauma).  What are the signs or symptoms?  Symptoms of this condition include:  · Ear pain.  · Fever.  · Decreased hearing.  · Tiredness (lethargy).  · Fluid leaking from the ear, if the eardrum is ruptured or has burst.  · Ringing in the ear.  How is this diagnosed?    This condition is diagnosed with a physical exam. During the exam, your health care provider will use an instrument called an otoscope to look in your ear and check for redness, swelling, and fluid. He or she will also ask about your  symptoms.  Your health care provider may also order tests, such as:  · A pneumatic otoscopy. This is a test to check the movement of the eardrum. It is done by squeezing a small amount of air into the ear.  · A tympanogram is a test that shows how well the eardrum moves in response to air pressure in the ear canal. It provides a graph for your health care provider to review.  How is this treated?  This condition can go away on its own within 3-5 days. But if the condition is caused by a bacterial infection and does not go away on its own, or if it keeps coming back, your health care provider may:  · Prescribe antibiotic medicine to treat the infection.  · Prescribe or recommend medicines to control pain.  Follow these instructions at home:  · Take over-the-counter and prescription medicines only as told by your health care provider.  · If you were prescribed an antibiotic medicine, take it as told by your health care provider. Do not stop taking the antibiotic even if you start to feel better.  · Keep all follow-up visits as told by your health care provider. This is important.  Contact a health care provider if:  · You have bleeding from your nose.  · There is a lump on your neck.  · You are not feeling better in 5 days.  · You feel worse instead of better.  Get help right away if:  · You have severe pain that is not controlled with medicine.  · You have swelling, redness, or pain around your ear.  · You have stiffness in your neck.  · A part of your face is not moving (paralyzed).  · The bone behind your ear (mastoid) is tender when you touch it.  · You develop a severe headache.  Summary  · Otitis media is redness, soreness, and swelling of the middle ear, usually resulting in pain.  · This condition can go away on its own within 3-5 days.  · If the problem does not go away in 3-5 days, your health care provider may prescribe or recommend medicines to treat the infection or your symptoms.  · If you were  prescribed an antibiotic medicine, take it as told by your health care provider.  · Follow all instructions you were given by your health care provider.  This information is not intended to replace advice given to you by your health care provider. Make sure you discuss any questions you have with your health care provider.  Document Revised: 11/19/2020 Document Reviewed: 11/19/2020  Elsevier Patient Education © 2021 [employer]

## 2021-09-10 NOTE — PROGRESS NOTES
Subjective   Cherelle Bourgeois is a 50 y.o. female.     History of Present Illness   Patient presents with c/o right ear pain that started a couple of days ago and worsened yesterday. Patient reports cough that has been ongoing for about 1 month. She denies any fever or shortness of breath. She reports that she has used over the ear drops for ear and ibuprofen for discomfort. She recently had recurrence of shingles on her right shoulder blade. She is still taking the valtrex for this.     The following portions of the patient's history were reviewed and updated as appropriate: allergies, current medications, past family history, past medical history, past social history, past surgical history and problem list.    Review of Systems   Constitutional: Negative for appetite change, chills, fatigue and fever.   HENT: Positive for ear pain. Negative for congestion, postnasal drip, rhinorrhea, sinus pressure, sneezing and sore throat.    Eyes: Negative for redness and itching.   Respiratory: Positive for cough. Negative for chest tightness, shortness of breath and wheezing.    Cardiovascular: Negative for chest pain, palpitations and leg swelling.   Musculoskeletal: Negative for myalgias.   Allergic/Immunologic: Negative.    Neurological: Negative for dizziness and headache.       Objective   Physical Exam  Vitals and nursing note reviewed.   Constitutional:       Appearance: She is well-developed.   HENT:      Head: Normocephalic and atraumatic.      Right Ear: Ear canal and external ear normal. Tympanic membrane is erythematous and bulging.      Left Ear: Tympanic membrane, ear canal and external ear normal.      Nose: Nose normal.      Mouth/Throat:      Lips: Pink.      Mouth: Mucous membranes are moist.      Pharynx: Oropharynx is clear. No oropharyngeal exudate or posterior oropharyngeal erythema.      Tonsils: No tonsillar exudate.   Eyes:      General:         Right eye: No discharge.         Left eye: No discharge.       Conjunctiva/sclera: Conjunctivae normal.      Pupils: Pupils are equal, round, and reactive to light.   Neck:      Thyroid: No thyromegaly.   Cardiovascular:      Rate and Rhythm: Normal rate and regular rhythm.      Heart sounds: Normal heart sounds. No murmur heard.     Pulmonary:      Effort: Pulmonary effort is normal. No tachypnea or respiratory distress.      Breath sounds: Normal breath sounds. No decreased breath sounds, wheezing or rales.   Musculoskeletal:      Cervical back: Normal range of motion and neck supple.   Lymphadenopathy:      Cervical: No cervical adenopathy.   Skin:     General: Skin is warm and dry.   Neurological:      Mental Status: She is alert and oriented to person, place, and time.   Psychiatric:         Behavior: Behavior normal.         Thought Content: Thought content normal.         Judgment: Judgment normal.         Vitals:    09/10/21 1241   BP: 120/80   Pulse: 77   Resp: 16   SpO2: 99%     Body mass index is 28.97 kg/m².    Procedures    Assessment/Plan   Problems Addressed this Visit     None      Visit Diagnoses     Acute otitis media, unspecified otitis media type    -  Primary    Relevant Medications    azithromycin (Zithromax Z-Clovis) 250 MG tablet      Diagnoses       Codes Comments    Acute otitis media, unspecified otitis media type    -  Primary ICD-10-CM: H66.90  ICD-9-CM: 382.9         Azithromycin as directed.  May use over the counter zyrtec for postnasal drip.  Delsym for cough.          Return if symptoms worsen or fail to improve.       Patient Instructions   I am prescribing you an antibiotic, it is important that you take all of this medication even if you are feeling better.  May use over the counter zyrtec for postnasal drip.  Delsym for cough.   Return if symptoms worsen or fail to improve.       Otitis Media, Adult    Otitis media occurs when there is inflammation and fluid in the middle ear space with signs and symptoms of an acute infection. The middle  ear is a part of the ear that contains bones for hearing as well as air that helps send sounds to the brain. When infected fluid builds up in this space, it causes pressure and results in symptoms of acute otitis media. The eustachian tube connects the middle ear to the back of the nose (nasopharynx) and normally allows air into the middle ear space. If the eustachian tube becomes blocked, fluid can build up and become infected.  What are the causes?  This condition is caused by a blockage in the eustachian tube. This can be caused by an object like mucus, or by swelling (edema) of the tube. Problems that can cause a blockage include:  · A cold or other upper respiratory infection.  · Allergies.  · An irritant, such as tobacco smoke.  · Enlarged adenoids. The adenoids are areas of soft tissue located high in the back of the throat, behind the nose and the roof of the mouth. They are part of the body's defense system (immune system).  · A mass in the nasopharynx.  · Damage to the ear caused by pressure changes (barotrauma).  What are the signs or symptoms?  Symptoms of this condition include:  · Ear pain.  · Fever.  · Decreased hearing.  · Tiredness (lethargy).  · Fluid leaking from the ear, if the eardrum is ruptured or has burst.  · Ringing in the ear.  How is this diagnosed?    This condition is diagnosed with a physical exam. During the exam, your health care provider will use an instrument called an otoscope to look in your ear and check for redness, swelling, and fluid. He or she will also ask about your symptoms.  Your health care provider may also order tests, such as:  · A pneumatic otoscopy. This is a test to check the movement of the eardrum. It is done by squeezing a small amount of air into the ear.  · A tympanogram is a test that shows how well the eardrum moves in response to air pressure in the ear canal. It provides a graph for your health care provider to review.  How is this treated?  This  condition can go away on its own within 3-5 days. But if the condition is caused by a bacterial infection and does not go away on its own, or if it keeps coming back, your health care provider may:  · Prescribe antibiotic medicine to treat the infection.  · Prescribe or recommend medicines to control pain.  Follow these instructions at home:  · Take over-the-counter and prescription medicines only as told by your health care provider.  · If you were prescribed an antibiotic medicine, take it as told by your health care provider. Do not stop taking the antibiotic even if you start to feel better.  · Keep all follow-up visits as told by your health care provider. This is important.  Contact a health care provider if:  · You have bleeding from your nose.  · There is a lump on your neck.  · You are not feeling better in 5 days.  · You feel worse instead of better.  Get help right away if:  · You have severe pain that is not controlled with medicine.  · You have swelling, redness, or pain around your ear.  · You have stiffness in your neck.  · A part of your face is not moving (paralyzed).  · The bone behind your ear (mastoid) is tender when you touch it.  · You develop a severe headache.  Summary  · Otitis media is redness, soreness, and swelling of the middle ear, usually resulting in pain.  · This condition can go away on its own within 3-5 days.  · If the problem does not go away in 3-5 days, your health care provider may prescribe or recommend medicines to treat the infection or your symptoms.  · If you were prescribed an antibiotic medicine, take it as told by your health care provider.  · Follow all instructions you were given by your health care provider.  This information is not intended to replace advice given to you by your health care provider. Make sure you discuss any questions you have with your health care provider.  Document Revised: 11/19/2020 Document Reviewed: 11/19/2020  Elsevier Patient Education ©  2021 [employer]

## 2021-10-08 ENCOUNTER — E-VISIT (OUTPATIENT)
Dept: FAMILY MEDICINE CLINIC | Facility: TELEHEALTH | Age: 51
End: 2021-10-08

## 2021-10-08 NOTE — PROGRESS NOTES
I counseled the patient to follow up with PCP or UC.    Cough greater than 2 months. Advised in person evaluation.

## 2021-10-12 ENCOUNTER — OFFICE VISIT (OUTPATIENT)
Dept: FAMILY MEDICINE CLINIC | Facility: CLINIC | Age: 51
End: 2021-10-12

## 2021-10-12 VITALS
RESPIRATION RATE: 16 BRPM | WEIGHT: 153 LBS | HEART RATE: 91 BPM | SYSTOLIC BLOOD PRESSURE: 140 MMHG | OXYGEN SATURATION: 98 % | BODY MASS INDEX: 27.98 KG/M2 | DIASTOLIC BLOOD PRESSURE: 88 MMHG

## 2021-10-12 DIAGNOSIS — R05.9 COUGH: Primary | ICD-10-CM

## 2021-10-12 PROCEDURE — 99213 OFFICE O/P EST LOW 20 MIN: CPT | Performed by: NURSE PRACTITIONER

## 2021-10-12 RX ORDER — METHYLPREDNISOLONE 4 MG/1
TABLET ORAL
Qty: 1 EACH | Refills: 0 | Status: SHIPPED | OUTPATIENT
Start: 2021-10-12 | End: 2021-10-29

## 2021-10-12 RX ORDER — BENZONATATE 100 MG/1
100 CAPSULE ORAL 3 TIMES DAILY PRN
Qty: 30 CAPSULE | Refills: 0 | Status: SHIPPED | OUTPATIENT
Start: 2021-10-12 | End: 2021-10-22

## 2021-10-12 RX ORDER — DEXTROMETHORPHAN HYDROBROMIDE AND PROMETHAZINE HYDROCHLORIDE 15; 6.25 MG/5ML; MG/5ML
5 SYRUP ORAL 4 TIMES DAILY PRN
Qty: 180 ML | Refills: 0 | Status: SHIPPED | OUTPATIENT
Start: 2021-10-12 | End: 2021-10-29

## 2021-10-12 NOTE — PROGRESS NOTES
Subjective   Cherelle Bourgeois is a 51 y.o. female.     History of Present Illness   Patient presents with c/o cough, ongoing, that started the end of July and beginning of August. Patient reports that she is coughing up phlegm and is waking up at night. She reports that she is taking an over the counter antihistamine. She denies any history of asthma or smoking. She does have a long history of allergies.      The following portions of the patient's history were reviewed and updated as appropriate: allergies, current medications, past family history, past medical history, past social history, past surgical history and problem list.    Review of Systems   Constitutional: Negative for appetite change, chills, fatigue and fever.   HENT: Positive for postnasal drip. Negative for congestion, ear pain, rhinorrhea, sinus pressure, sneezing and sore throat.    Eyes: Negative for redness and itching.   Respiratory: Positive for cough. Negative for chest tightness, shortness of breath and wheezing.    Cardiovascular: Negative for chest pain, palpitations and leg swelling.   Musculoskeletal: Negative for myalgias.   Allergic/Immunologic: Negative.    Neurological: Negative for dizziness and headache.       Objective   Physical Exam  Vitals and nursing note reviewed.   Constitutional:       Appearance: She is well-developed.   HENT:      Head: Normocephalic and atraumatic.      Right Ear: Ear canal and external ear normal. A middle ear effusion is present. Tympanic membrane is scarred.      Left Ear: Ear canal and external ear normal.      Nose: Nose normal.      Right Sinus: No maxillary sinus tenderness or frontal sinus tenderness.      Left Sinus: No maxillary sinus tenderness or frontal sinus tenderness.      Mouth/Throat:      Lips: Pink.      Mouth: Mucous membranes are moist.      Pharynx: Oropharynx is clear. No pharyngeal swelling, oropharyngeal exudate or posterior oropharyngeal erythema.      Tonsils: No tonsillar  exudate or tonsillar abscesses.      Comments: Postnasal drainage noted.   Eyes:      General:         Right eye: No discharge.         Left eye: No discharge.      Conjunctiva/sclera: Conjunctivae normal.      Pupils: Pupils are equal, round, and reactive to light.   Neck:      Thyroid: No thyromegaly.   Cardiovascular:      Rate and Rhythm: Normal rate and regular rhythm.      Heart sounds: Normal heart sounds. No murmur heard.      Pulmonary:      Effort: Pulmonary effort is normal. No tachypnea or respiratory distress.      Breath sounds: Normal breath sounds. No decreased breath sounds, wheezing or rales.   Musculoskeletal:      Cervical back: Normal range of motion and neck supple.   Lymphadenopathy:      Cervical: No cervical adenopathy.   Skin:     General: Skin is warm and dry.   Neurological:      Mental Status: She is alert and oriented to person, place, and time.   Psychiatric:         Behavior: Behavior normal.         Thought Content: Thought content normal.         Judgment: Judgment normal.         Vitals:    10/12/21 1106   BP: 140/88   Pulse: 91   Resp: 16   SpO2: 98%     Body mass index is 27.98 kg/m².    Procedures    Assessment/Plan   Problems Addressed this Visit     None      Visit Diagnoses     Cough    -  Primary    Relevant Medications    methylPREDNISolone (MEDROL) 4 MG dose pack    Other Relevant Orders    XR Chest PA & Lateral    Ambulatory Referral to Allergy (Completed)      Diagnoses       Codes Comments    Cough    -  Primary ICD-10-CM: R05.9  ICD-9-CM: 786.2         Medrol dose pack  Referral to allergy   CXR         Return if symptoms worsen or fail to improve.       Patient Instructions   Do not take Promethazine and benzonatate at the same time.  Return if symptoms worsen or fail to improve.  Call with any questions or concerns.     Cough, Adult  A cough helps to clear your throat and lungs. A cough may be a sign of an illness or another medical condition.  An acute cough may  only last 2-3 weeks, while a chronic cough may last 8 or more weeks.  Many things can cause a cough. They include:  · Germs (viruses or bacteria) that attack the airway.  · Breathing in things that bother (irritate) your lungs.  · Allergies.  · Asthma.  · Mucus that runs down the back of your throat (postnasal drip).  · Smoking.  · Acid backing up from the stomach into the tube that moves food from the mouth to the stomach (gastroesophageal reflux).  · Some medicines.  · Lung problems.  · Other medical conditions, such as heart failure or a blood clot in the lung (pulmonary embolism).  Follow these instructions at home:  Medicines  · Take over-the-counter and prescription medicines only as told by your doctor.  · Talk with your doctor before you take medicines that stop a cough (cough suppressants).  Lifestyle    · Do not smoke, and try not to be around smoke. Do not use any products that contain nicotine or tobacco, such as cigarettes, e-cigarettes, and chewing tobacco. If you need help quitting, ask your doctor.  · Drink enough fluid to keep your pee (urine) pale yellow.  · Avoid caffeine.  · Do not drink alcohol if your doctor tells you not to drink.    General instructions    · Watch for any changes in your cough. Tell your doctor about them.  · Always cover your mouth when you cough.  · Stay away from things that make you cough, such as perfume, candles, campfire smoke, or cleaning products.  · If the air is dry, use a cool mist vaporizer or humidifier in your home.  · If your cough is worse at night, try using extra pillows to raise your head up higher while you sleep.  · Rest as needed.  · Keep all follow-up visits as told by your doctor. This is important.    Contact a doctor if:  · You have new symptoms.  · You cough up pus.  · Your cough does not get better after 2-3 weeks, or your cough gets worse.  · Cough medicine does not help your cough and you are not sleeping well.  · You have pain that gets worse  or pain that is not helped with medicine.  · You have a fever.  · You are losing weight and you do not know why.  · You have night sweats.  Get help right away if:  · You cough up blood.  · You have trouble breathing.  · Your heartbeat is very fast.  These symptoms may be an emergency. Do not wait to see if the symptoms will go away. Get medical help right away. Call your local emergency services (911 in the [place] Do not drive yourself to the hospital.  Summary  · A cough helps to clear your throat and lungs. Many things can cause a cough.  · Take over-the-counter and prescription medicines only as told by your doctor.  · Always cover your mouth when you cough.  · Contact a doctor if you have new symptoms or you have a cough that does not get better or gets worse.  This information is not intended to replace advice given to you by your health care provider. Make sure you discuss any questions you have with your health care provider.  Document Revised: 02/05/2021 Document Reviewed: 01/06/2020  Elsevier Patient Education © 2021 [employer]

## 2021-10-12 NOTE — PATIENT INSTRUCTIONS
Do not take Promethazine and benzonatate at the same time.  Return if symptoms worsen or fail to improve.  Call with any questions or concerns.     Cough, Adult  A cough helps to clear your throat and lungs. A cough may be a sign of an illness or another medical condition.  An acute cough may only last 2-3 weeks, while a chronic cough may last 8 or more weeks.  Many things can cause a cough. They include:  · Germs (viruses or bacteria) that attack the airway.  · Breathing in things that bother (irritate) your lungs.  · Allergies.  · Asthma.  · Mucus that runs down the back of your throat (postnasal drip).  · Smoking.  · Acid backing up from the stomach into the tube that moves food from the mouth to the stomach (gastroesophageal reflux).  · Some medicines.  · Lung problems.  · Other medical conditions, such as heart failure or a blood clot in the lung (pulmonary embolism).  Follow these instructions at home:  Medicines  · Take over-the-counter and prescription medicines only as told by your doctor.  · Talk with your doctor before you take medicines that stop a cough (cough suppressants).  Lifestyle    · Do not smoke, and try not to be around smoke. Do not use any products that contain nicotine or tobacco, such as cigarettes, e-cigarettes, and chewing tobacco. If you need help quitting, ask your doctor.  · Drink enough fluid to keep your pee (urine) pale yellow.  · Avoid caffeine.  · Do not drink alcohol if your doctor tells you not to drink.    General instructions    · Watch for any changes in your cough. Tell your doctor about them.  · Always cover your mouth when you cough.  · Stay away from things that make you cough, such as perfume, candles, campfire smoke, or cleaning products.  · If the air is dry, use a cool mist vaporizer or humidifier in your home.  · If your cough is worse at night, try using extra pillows to raise your head up higher while you sleep.  · Rest as needed.  · Keep all follow-up visits as  told by your doctor. This is important.    Contact a doctor if:  · You have new symptoms.  · You cough up pus.  · Your cough does not get better after 2-3 weeks, or your cough gets worse.  · Cough medicine does not help your cough and you are not sleeping well.  · You have pain that gets worse or pain that is not helped with medicine.  · You have a fever.  · You are losing weight and you do not know why.  · You have night sweats.  Get help right away if:  · You cough up blood.  · You have trouble breathing.  · Your heartbeat is very fast.  These symptoms may be an emergency. Do not wait to see if the symptoms will go away. Get medical help right away. Call your local emergency services (911 in the [place] Do not drive yourself to the hospital.  Summary  · A cough helps to clear your throat and lungs. Many things can cause a cough.  · Take over-the-counter and prescription medicines only as told by your doctor.  · Always cover your mouth when you cough.  · Contact a doctor if you have new symptoms or you have a cough that does not get better or gets worse.  This information is not intended to replace advice given to you by your health care provider. Make sure you discuss any questions you have with your health care provider.  Document Revised: 02/05/2021 Document Reviewed: 01/06/2020  Elsevier Patient Education © 2021 [employer]

## 2021-10-13 ENCOUNTER — HOSPITAL ENCOUNTER (OUTPATIENT)
Dept: GENERAL RADIOLOGY | Facility: HOSPITAL | Age: 51
Discharge: HOME | End: 2021-10-13
Admitting: NURSE PRACTITIONER

## 2021-10-13 DIAGNOSIS — R05.9 COUGH: ICD-10-CM

## 2021-10-13 PROCEDURE — 71046 X-RAY EXAM CHEST 2 VIEWS: CPT

## 2021-10-29 ENCOUNTER — OFFICE VISIT (OUTPATIENT)
Dept: FAMILY MEDICINE CLINIC | Facility: CLINIC | Age: 51
End: 2021-10-29

## 2021-10-29 VITALS
HEART RATE: 84 BPM | RESPIRATION RATE: 16 BRPM | BODY MASS INDEX: 27.23 KG/M2 | HEIGHT: 62 IN | WEIGHT: 148 LBS | DIASTOLIC BLOOD PRESSURE: 88 MMHG | SYSTOLIC BLOOD PRESSURE: 122 MMHG | OXYGEN SATURATION: 99 %

## 2021-10-29 DIAGNOSIS — H66.90 ACUTE OTITIS MEDIA, UNSPECIFIED OTITIS MEDIA TYPE: Primary | ICD-10-CM

## 2021-10-29 PROCEDURE — 99213 OFFICE O/P EST LOW 20 MIN: CPT | Performed by: NURSE PRACTITIONER

## 2021-10-29 RX ORDER — AZITHROMYCIN 250 MG/1
TABLET, FILM COATED ORAL
Qty: 6 TABLET | Refills: 0 | Status: SHIPPED | OUTPATIENT
Start: 2021-10-29 | End: 2022-04-06

## 2021-10-29 NOTE — PROGRESS NOTES
Subjective   Cherelle Bourgeois is a 51 y.o. female.     History of Present Illness   Patient presents with right ear pressure and ringing that started about 17 days ago. She reports that it is worse at night. Patient reports that she just wanted to have this checked out before the weekend. She is taking zyrtec and flonase daily with no relief. She is also taking ibuprofen for her symptoms.     The following portions of the patient's history were reviewed and updated as appropriate: allergies, current medications, past family history, past medical history, past social history, past surgical history and problem list.    Review of Systems   Constitutional: Negative for appetite change, chills, fatigue and fever.   HENT: Positive for congestion, ear pain and postnasal drip. Negative for ear discharge, rhinorrhea, sinus pressure, sneezing and sore throat.         Right ear pressure.    Eyes: Negative for redness and itching.   Respiratory: Positive for cough. Negative for chest tightness, shortness of breath and wheezing.    Cardiovascular: Negative for chest pain, palpitations and leg swelling.   Musculoskeletal: Negative for myalgias.   Allergic/Immunologic: Negative.    Neurological: Negative for dizziness and headache.       Objective   Physical Exam  Vitals and nursing note reviewed.   Constitutional:       Appearance: She is well-developed.   HENT:      Head: Normocephalic and atraumatic.      Right Ear: External ear normal. A middle ear effusion is present. There is no impacted cerumen. No foreign body. Tympanic membrane is erythematous. Tympanic membrane is not retracted.      Left Ear: Tympanic membrane, ear canal and external ear normal.  No middle ear effusion. There is no impacted cerumen.      Nose: Nose normal.      Mouth/Throat:      Pharynx: No oropharyngeal exudate.   Eyes:      General:         Right eye: No discharge.         Left eye: No discharge.      Conjunctiva/sclera: Conjunctivae normal.       Pupils: Pupils are equal, round, and reactive to light.   Neck:      Thyroid: No thyromegaly.   Cardiovascular:      Rate and Rhythm: Normal rate and regular rhythm.      Heart sounds: Normal heart sounds. No murmur heard.      Pulmonary:      Effort: Pulmonary effort is normal. No tachypnea or respiratory distress.      Breath sounds: Normal breath sounds. No decreased breath sounds, wheezing or rales.   Musculoskeletal:      Cervical back: Normal range of motion and neck supple.   Lymphadenopathy:      Cervical: No cervical adenopathy.   Skin:     General: Skin is warm and dry.   Neurological:      Mental Status: She is alert and oriented to person, place, and time.   Psychiatric:         Behavior: Behavior normal.         Thought Content: Thought content normal.         Judgment: Judgment normal.         Vitals:    10/29/21 0845   BP: 122/88   Pulse: 84   Resp: 16   SpO2: 99%     Body mass index is 27.24 kg/m².    Procedures    Assessment/Plan   Problems Addressed this Visit     None      Visit Diagnoses     Acute otitis media, unspecified otitis media type    -  Primary    Relevant Medications    azithromycin (Zithromax Z-Clovis) 250 MG tablet      Diagnoses       Codes Comments    Acute otitis media, unspecified otitis media type    -  Primary ICD-10-CM: H66.90  ICD-9-CM: 382.9         Zithromax as directed  Continue Zyrtec  Continue Flonase         Return if symptoms worsen or fail to improve.       Patient Instructions   Return if symptoms worsen or fail to improve.  Call with any questions or concerns.  I am prescribing you an antibiotic, it is important that you take all of this medication even if you are feeling better.   Continue Zyrtec  Continue Flonase      Otitis Media, Adult    Otitis media occurs when there is inflammation and fluid in the middle ear space with signs and symptoms of an acute infection. The middle ear is a part of the ear that contains bones for hearing as well as air that helps send  sounds to the brain. When infected fluid builds up in this space, it causes pressure and results in symptoms of acute otitis media. The eustachian tube connects the middle ear to the back of the nose (nasopharynx) and normally allows air into the middle ear space. If the eustachian tube becomes blocked, fluid can build up and become infected.  What are the causes?  This condition is caused by a blockage in the eustachian tube. This can be caused by an object like mucus, or by swelling (edema) of the tube. Problems that can cause a blockage include:  · A cold or other upper respiratory infection.  · Allergies.  · An irritant, such as tobacco smoke.  · Enlarged adenoids. The adenoids are areas of soft tissue located high in the back of the throat, behind the nose and the roof of the mouth. They are part of the body's defense system (immune system).  · A mass in the nasopharynx.  · Damage to the ear caused by pressure changes (barotrauma).  What are the signs or symptoms?  Symptoms of this condition include:  · Ear pain.  · Fever.  · Decreased hearing.  · Tiredness (lethargy).  · Fluid leaking from the ear, if the eardrum is ruptured or has burst.  · Ringing in the ear.  How is this diagnosed?    This condition is diagnosed with a physical exam. During the exam, your health care provider will use an instrument called an otoscope to look in your ear and check for redness, swelling, and fluid. He or she will also ask about your symptoms.  Your health care provider may also order tests, such as:  · A pneumatic otoscopy. This is a test to check the movement of the eardrum. It is done by squeezing a small amount of air into the ear.  · A tympanogram is a test that shows how well the eardrum moves in response to air pressure in the ear canal. It provides a graph for your health care provider to review.  How is this treated?  This condition can go away on its own within 3-5 days. But if the condition is caused by a bacterial  infection and does not go away on its own, or if it keeps coming back, your health care provider may:  · Prescribe antibiotic medicine to treat the infection.  · Prescribe or recommend medicines to control pain.  Follow these instructions at home:  · Take over-the-counter and prescription medicines only as told by your health care provider.  · If you were prescribed an antibiotic medicine, take it as told by your health care provider. Do not stop taking the antibiotic even if you start to feel better.  · Keep all follow-up visits as told by your health care provider. This is important.  Contact a health care provider if:  · You have bleeding from your nose.  · There is a lump on your neck.  · You are not feeling better in 5 days.  · You feel worse instead of better.  Get help right away if:  · You have severe pain that is not controlled with medicine.  · You have swelling, redness, or pain around your ear.  · You have stiffness in your neck.  · A part of your face is not moving (paralyzed).  · The bone behind your ear (mastoid) is tender when you touch it.  · You develop a severe headache.  Summary  · Otitis media is redness, soreness, and swelling of the middle ear, usually resulting in pain.  · This condition can go away on its own within 3-5 days.  · If the problem does not go away in 3-5 days, your health care provider may prescribe or recommend medicines to treat the infection or your symptoms.  · If you were prescribed an antibiotic medicine, take it as told by your health care provider.  · Follow all instructions you were given by your health care provider.  This information is not intended to replace advice given to you by your health care provider. Make sure you discuss any questions you have with your health care provider.  Document Revised: 11/19/2020 Document Reviewed: 11/19/2020  Elsevier Patient Education © 2021 [employer]

## 2021-10-29 NOTE — PATIENT INSTRUCTIONS
Return if symptoms worsen or fail to improve.  Call with any questions or concerns.  I am prescribing you an antibiotic, it is important that you take all of this medication even if you are feeling better.   Continue Zyrtec  Continue Flonase      Otitis Media, Adult    Otitis media occurs when there is inflammation and fluid in the middle ear space with signs and symptoms of an acute infection. The middle ear is a part of the ear that contains bones for hearing as well as air that helps send sounds to the brain. When infected fluid builds up in this space, it causes pressure and results in symptoms of acute otitis media. The eustachian tube connects the middle ear to the back of the nose (nasopharynx) and normally allows air into the middle ear space. If the eustachian tube becomes blocked, fluid can build up and become infected.  What are the causes?  This condition is caused by a blockage in the eustachian tube. This can be caused by an object like mucus, or by swelling (edema) of the tube. Problems that can cause a blockage include:  · A cold or other upper respiratory infection.  · Allergies.  · An irritant, such as tobacco smoke.  · Enlarged adenoids. The adenoids are areas of soft tissue located high in the back of the throat, behind the nose and the roof of the mouth. They are part of the body's defense system (immune system).  · A mass in the nasopharynx.  · Damage to the ear caused by pressure changes (barotrauma).  What are the signs or symptoms?  Symptoms of this condition include:  · Ear pain.  · Fever.  · Decreased hearing.  · Tiredness (lethargy).  · Fluid leaking from the ear, if the eardrum is ruptured or has burst.  · Ringing in the ear.  How is this diagnosed?    This condition is diagnosed with a physical exam. During the exam, your health care provider will use an instrument called an otoscope to look in your ear and check for redness, swelling, and fluid. He or she will also ask about your  symptoms.  Your health care provider may also order tests, such as:  · A pneumatic otoscopy. This is a test to check the movement of the eardrum. It is done by squeezing a small amount of air into the ear.  · A tympanogram is a test that shows how well the eardrum moves in response to air pressure in the ear canal. It provides a graph for your health care provider to review.  How is this treated?  This condition can go away on its own within 3-5 days. But if the condition is caused by a bacterial infection and does not go away on its own, or if it keeps coming back, your health care provider may:  · Prescribe antibiotic medicine to treat the infection.  · Prescribe or recommend medicines to control pain.  Follow these instructions at home:  · Take over-the-counter and prescription medicines only as told by your health care provider.  · If you were prescribed an antibiotic medicine, take it as told by your health care provider. Do not stop taking the antibiotic even if you start to feel better.  · Keep all follow-up visits as told by your health care provider. This is important.  Contact a health care provider if:  · You have bleeding from your nose.  · There is a lump on your neck.  · You are not feeling better in 5 days.  · You feel worse instead of better.  Get help right away if:  · You have severe pain that is not controlled with medicine.  · You have swelling, redness, or pain around your ear.  · You have stiffness in your neck.  · A part of your face is not moving (paralyzed).  · The bone behind your ear (mastoid) is tender when you touch it.  · You develop a severe headache.  Summary  · Otitis media is redness, soreness, and swelling of the middle ear, usually resulting in pain.  · This condition can go away on its own within 3-5 days.  · If the problem does not go away in 3-5 days, your health care provider may prescribe or recommend medicines to treat the infection or your symptoms.  · If you were  prescribed an antibiotic medicine, take it as told by your health care provider.  · Follow all instructions you were given by your health care provider.  This information is not intended to replace advice given to you by your health care provider. Make sure you discuss any questions you have with your health care provider.  Document Revised: 11/19/2020 Document Reviewed: 11/19/2020  Elsevier Patient Education © 2021 Elsevier Inc.

## 2022-04-06 ENCOUNTER — OFFICE VISIT (OUTPATIENT)
Dept: FAMILY MEDICINE CLINIC | Facility: CLINIC | Age: 52
End: 2022-04-06

## 2022-04-06 VITALS
DIASTOLIC BLOOD PRESSURE: 78 MMHG | SYSTOLIC BLOOD PRESSURE: 126 MMHG | BODY MASS INDEX: 24.84 KG/M2 | HEART RATE: 73 BPM | RESPIRATION RATE: 18 BRPM | WEIGHT: 135 LBS | OXYGEN SATURATION: 98 %

## 2022-04-06 DIAGNOSIS — B02.9 HERPES ZOSTER WITHOUT COMPLICATION: Primary | ICD-10-CM

## 2022-04-06 PROCEDURE — 99213 OFFICE O/P EST LOW 20 MIN: CPT | Performed by: FAMILY MEDICINE

## 2022-04-06 RX ORDER — VALACYCLOVIR HYDROCHLORIDE 1 G/1
1000 TABLET, FILM COATED ORAL 3 TIMES DAILY
Qty: 30 TABLET | Refills: 0 | Status: SHIPPED | OUTPATIENT
Start: 2022-04-06 | End: 2022-04-16

## 2022-04-06 NOTE — PROGRESS NOTES
Subjective   Cherelle Bourgeois is a 51 y.o. female.   Herpes Zoster (Flareup on R shoulder x 1 day)    History of Present Illness     Cherelle is here  today for help with  a flareup of shingles on her R shoulder x 1 day.   She has had shingles in the past and is very aware of what it looks like and feel like.  She has pain in her right shoulder and is noticed a red papular rash developing from the the middle of her spine just below her neck to her right shoulder.  She states the whole area is quite painful.  She has not yet gotten a shingles vaccine but will consider 1 soon as she is well past this episode.  She has not tried any over-the-counter medication yet other than Tylenol.    The following portions of the patient's history were reviewed and updated as appropriate: allergies, current medications, past family history, past medical history, past social history, past surgical history and problem list.    Review of Systems   Musculoskeletal:        Right scapular pain   Skin: Positive for rash.       Objective   Physical Exam  Vitals and nursing note reviewed.   Constitutional:       Appearance: She is well-developed.   HENT:      Head: Normocephalic and atraumatic.   Eyes:      Pupils: Pupils are equal, round, and reactive to light.   Cardiovascular:      Rate and Rhythm: Normal rate and regular rhythm.      Heart sounds: Normal heart sounds.   Pulmonary:      Effort: Pulmonary effort is normal.      Breath sounds: Normal breath sounds.   Musculoskeletal:         General: Normal range of motion.   Skin:     General: Skin is warm and dry.      Findings: Rash present.      Comments: Right upper back not crossing spine is a red papular rash that spreads over to the scapula and to the base of the scapula.   Neurological:      Mental Status: She is alert and oriented to person, place, and time.           Assessment/Plan   Problem List Items Addressed This Visit    None     Visit Diagnoses     Herpes zoster without  complication    -  Primary    Relevant Medications    valACYclovir (Valtrex) 1000 MG tablet      I have started her on Valtrex and advised using calamine lotion and avoiding steroid topicals.  I encouraged her to follow-up with me if this does not resolve quickly or if her pain gets worse after using Valtrex.         No follow-ups on file.

## 2022-04-26 ENCOUNTER — OFFICE VISIT (OUTPATIENT)
Dept: FAMILY MEDICINE CLINIC | Facility: CLINIC | Age: 52
End: 2022-04-26

## 2022-04-26 VITALS
SYSTOLIC BLOOD PRESSURE: 112 MMHG | HEIGHT: 62 IN | OXYGEN SATURATION: 99 % | HEART RATE: 74 BPM | BODY MASS INDEX: 24.48 KG/M2 | WEIGHT: 133 LBS | DIASTOLIC BLOOD PRESSURE: 82 MMHG

## 2022-04-26 DIAGNOSIS — B02.29 POST HERPETIC NEURALGIA: Primary | ICD-10-CM

## 2022-04-26 PROCEDURE — 99213 OFFICE O/P EST LOW 20 MIN: CPT | Performed by: NURSE PRACTITIONER

## 2022-04-26 NOTE — PROGRESS NOTES
Subjective   Cherelle Bourgeois is a 51 y.o. female.     History of Present Illness   Patient presents post shingles infection with continued pain in her right shoulder. She was treated by Dr. Rene on 4/6/2022 and reports that she finished the medication. She states that her shoulder pain to continue. She states the pain is mild, but irritating. She has been taking ibuprofen for this.    The following portions of the patient's history were reviewed and updated as appropriate: allergies, current medications, past family history, past medical history, past social history, past surgical history and problem list.    Review of Systems   Constitutional: Negative for chills, fatigue and fever.   Respiratory: Negative for cough, chest tightness, shortness of breath and wheezing.    Cardiovascular: Negative for chest pain, palpitations and leg swelling.   Musculoskeletal: Negative for arthralgias and joint swelling.   Skin: Positive for rash. Negative for pallor, skin lesions and wound.   Allergic/Immunologic: Negative.    Neurological: Negative for dizziness, weakness and headache.       Objective   Physical Exam  Vitals and nursing note reviewed.   Constitutional:       Appearance: She is well-developed.   HENT:      Head: Normocephalic and atraumatic.   Eyes:      Conjunctiva/sclera: Conjunctivae normal.      Pupils: Pupils are equal, round, and reactive to light.   Cardiovascular:      Rate and Rhythm: Normal rate and regular rhythm.      Heart sounds: Normal heart sounds. No murmur heard.  Pulmonary:      Effort: Pulmonary effort is normal.      Breath sounds: Normal breath sounds.   Musculoskeletal:         General: No deformity.      Cervical back: Normal range of motion and neck supple.   Lymphadenopathy:      Cervical: No cervical adenopathy.   Skin:     General: Skin is warm and dry.      Findings: Rash present. No erythema or lesion.      Comments: 1 small lesion with scabbing noted.    Neurological:      Mental  Status: She is alert and oriented to person, place, and time.   Psychiatric:         Behavior: Behavior normal.         Thought Content: Thought content normal.         Judgment: Judgment normal.         Vitals:    04/26/22 0848   BP: 112/82   Pulse: 74   SpO2: 99%     Body mass index is 24.33 kg/m².    Procedures    Assessment/Plan   Problems Addressed this Visit    None     Visit Diagnoses     Post herpetic neuralgia    -  Primary      Diagnoses       Codes Comments    Post herpetic neuralgia    -  Primary ICD-10-CM: B02.29  ICD-9-CM: 053.19         Continue ibuprofen as directed for pain  Voltaren gel to right shoulder as directed       Return if symptoms worsen or fail to improve.

## 2022-05-31 ENCOUNTER — TELEPHONE (OUTPATIENT)
Dept: FAMILY MEDICINE CLINIC | Facility: CLINIC | Age: 52
End: 2022-05-31

## 2022-05-31 DIAGNOSIS — B02.9 HERPES ZOSTER WITHOUT COMPLICATION: ICD-10-CM

## 2022-05-31 NOTE — TELEPHONE ENCOUNTER
Caller: Cheerlle Bourgeois    Relationship: Self    Best call back number: [phone]    What medication are you requesting: MEDICATION FOR SHINGLES     What are your current symptoms: SWELLING, PAIN, STINGING ON FACE     How long have you been experiencing symptoms: ONE DAY    Have you had these symptoms before:    [x] Yes  [] No    Have you been treated for these symptoms before:   [x] Yes  [] No    If a prescription is needed, what is your preferred pharmacy and phone number:      [id number] [place] [id number] [facility] AT [facility] [phone] [facility] [phone]     Additional notes: PATIENT STATED OVERNIGHT, SHE DEVELOPED SWELLING, PAIN, AND STINGING ON HER FACE. PATIENT IS CONCERNED THAT SHE MAY BE HAVING A SHINGLES OUTBREAK. PATIENT HAS ATTEMPTED TO GET IN FOR AN APPOINTMENT BUT THE SOONEST AVAILABLE IS Friday, AND PATIENT HAS AN APPOINTMENT FOR IT ALREADY. PATIENT WOULD LIKE TO REQUEST THE SAME MEDICATION THAT SHE WAS PRESCRIBED BEFORE FOR SHINGLES. PLEASE ADVISE.

## 2022-06-01 RX ORDER — VALACYCLOVIR HYDROCHLORIDE 1 G/1
TABLET, FILM COATED ORAL
Qty: 30 TABLET | Refills: 0 | OUTPATIENT
Start: 2022-06-01

## 2022-11-04 ENCOUNTER — HOSPITAL ENCOUNTER (OUTPATIENT)
Dept: GENERAL RADIOLOGY | Facility: HOSPITAL | Age: 52
Discharge: HOME | End: 2022-11-04
Admitting: NURSE PRACTITIONER

## 2022-11-04 ENCOUNTER — OFFICE VISIT (OUTPATIENT)
Dept: FAMILY MEDICINE CLINIC | Facility: CLINIC | Age: 52
End: 2022-11-04

## 2022-11-04 VITALS
BODY MASS INDEX: 21.16 KG/M2 | DIASTOLIC BLOOD PRESSURE: 88 MMHG | OXYGEN SATURATION: 98 % | SYSTOLIC BLOOD PRESSURE: 128 MMHG | WEIGHT: 115 LBS | HEART RATE: 73 BPM | HEIGHT: 62 IN | RESPIRATION RATE: 12 BRPM

## 2022-11-04 DIAGNOSIS — R07.81 RIB PAIN ON LEFT SIDE: ICD-10-CM

## 2022-11-04 DIAGNOSIS — R10.12 LEFT UPPER QUADRANT PAIN: Primary | ICD-10-CM

## 2022-11-04 DIAGNOSIS — R10.12 LEFT UPPER QUADRANT PAIN: ICD-10-CM

## 2022-11-04 DIAGNOSIS — Z23 NEED FOR VACCINATION: ICD-10-CM

## 2022-11-04 PROCEDURE — 99213 OFFICE O/P EST LOW 20 MIN: CPT | Performed by: NURSE PRACTITIONER

## 2022-11-04 PROCEDURE — 0124A COVID-19 (PFIZER) BIVALENT BOOSTER 12+YRS: CPT | Performed by: NURSE PRACTITIONER

## 2022-11-04 PROCEDURE — 91312 COVID-19 (PFIZER) BIVALENT BOOSTER 12+YRS: CPT | Performed by: NURSE PRACTITIONER

## 2022-11-04 PROCEDURE — 71046 X-RAY EXAM CHEST 2 VIEWS: CPT

## 2022-11-04 RX ORDER — FLUOXETINE HYDROCHLORIDE 20 MG/1
80 CAPSULE ORAL DAILY
COMMUNITY

## 2022-11-04 NOTE — PROGRESS NOTES
Subjective   Cherelle Bourgeois is a 52 y.o. female.     History of Present Illness   Patient presents with c/o with left upper quadrant rib pain that started about 1 month ago.  She reports that she felt that she cracked her ribs while picking up her dog, she fell over. She reports that she has had pain since then. She reports that pain feels like an aching. She denies any nausea, vomiting or diarrhea. She denies any shortness of breath. Patient has taken ibuprofen for her discomfort.     The following portions of the patient's history were reviewed and updated as appropriate: allergies, current medications, past family history, past medical history, past social history, past surgical history and problem list.    Review of Systems   Constitutional: Negative for chills, fatigue and fever.   Respiratory: Negative for cough, chest tightness, shortness of breath and wheezing.    Cardiovascular: Negative for chest pain, palpitations and leg swelling.   Gastrointestinal: Positive for abdominal pain (left upper quadrant). Negative for blood in stool, diarrhea, nausea, vomiting and indigestion.   Musculoskeletal: Positive for arthralgias (Left rib pain).   Neurological: Negative for dizziness and headache.   Hematological: Negative.    Psychiatric/Behavioral: Negative.  Negative for sleep disturbance.       Objective   Physical Exam  Vitals and nursing note reviewed.   Constitutional:       Appearance: She is well-developed.   HENT:      Head: Normocephalic and atraumatic.   Eyes:      Conjunctiva/sclera: Conjunctivae normal.      Pupils: Pupils are equal, round, and reactive to light.   Cardiovascular:      Rate and Rhythm: Normal rate and regular rhythm.      Heart sounds: Normal heart sounds. No murmur heard.  Pulmonary:      Effort: Pulmonary effort is normal.      Breath sounds: Normal breath sounds.   Neurological:      Mental Status: She is alert and oriented to person, place, and time.   Psychiatric:         Behavior:  Behavior normal.         Thought Content: Thought content normal.         Judgment: Judgment normal.         Vitals:    11/04/22 0926   BP: 128/88   Pulse: 73   Resp: 12   SpO2: 98%     Body mass index is 21.03 kg/m².    Procedures    Assessment & Plan   Problems Addressed this Visit    None  Visit Diagnoses     Left upper quadrant pain    -  Primary    Relevant Orders    XR Chest PA & Lateral    Rib pain on left side        Relevant Orders    XR Chest PA & Lateral      Diagnoses       Codes Comments    Left upper quadrant pain    -  Primary ICD-10-CM: R10.12  ICD-9-CM: 789.02     Rib pain on left side     ICD-10-CM: R07.81  ICD-9-CM: 786.50         Chest x-ray to rule out fractured ribs  Ibuprofen over the counter  Patient does not want to pursue physical therapy       Return if symptoms worsen or fail to improve.

## 2023-01-05 ENCOUNTER — OFFICE VISIT (OUTPATIENT)
Dept: FAMILY MEDICINE CLINIC | Facility: CLINIC | Age: 53
End: 2023-01-05
Payer: COMMERCIAL

## 2023-01-05 VITALS
TEMPERATURE: 99.4 F | DIASTOLIC BLOOD PRESSURE: 72 MMHG | OXYGEN SATURATION: 98 % | HEART RATE: 78 BPM | SYSTOLIC BLOOD PRESSURE: 128 MMHG

## 2023-01-05 DIAGNOSIS — U07.1 COVID-19 VIRUS INFECTION: Primary | ICD-10-CM

## 2023-01-05 DIAGNOSIS — R52 BODY ACHES: ICD-10-CM

## 2023-01-05 LAB
EXPIRATION DATE: ABNORMAL
FLUAV AG UPPER RESP QL IA.RAPID: NOT DETECTED
FLUBV AG UPPER RESP QL IA.RAPID: NOT DETECTED
INTERNAL CONTROL: ABNORMAL
Lab: ABNORMAL
SARS-COV-2 AG UPPER RESP QL IA.RAPID: DETECTED

## 2023-01-05 PROCEDURE — 99213 OFFICE O/P EST LOW 20 MIN: CPT | Performed by: FAMILY MEDICINE

## 2023-01-05 PROCEDURE — 87428 SARSCOV & INF VIR A&B AG IA: CPT | Performed by: FAMILY MEDICINE

## 2023-01-05 NOTE — PROGRESS NOTES
Chief Complaint  Generalized Body Aches (/) and Headache    Subjective    History of Present Illness {CC  Problem List  Visit  Diagnosis   Encounters  Notes  Medications  Labs  Result Review Imaging  Media :23}     Cherelle Bourgeois presents to [facility] for Generalized Body Aches (/) and Headache.  History of Present Illness     Here with complaints as above. Attended a wedding over the weekend, worried she might have picked up flu. Has been feverish with chills, body aches and headache. Got flu shot this fall, up to date on COVID vaccines. Has had flu in the past, felt similar. No SOB, no chest pain. No other members of the house sick.     Objective     Vital Signs:   /72 (BP Location: Left arm, Patient Position: Sitting)   Pulse 78   Temp 99.4 °F (37.4 °C)   SpO2 98%   Physical Exam  Vitals and nursing note reviewed.   Constitutional:       General: She is not in acute distress.     Appearance: Normal appearance. She is not ill-appearing.   Cardiovascular:      Rate and Rhythm: Normal rate and regular rhythm.      Pulses: Normal pulses.      Heart sounds: Normal heart sounds. No murmur heard.  Pulmonary:      Effort: Pulmonary effort is normal. No respiratory distress.      Breath sounds: Normal breath sounds. No rales.   Neurological:      Mental Status: She is alert and oriented to person, place, and time. Mental status is at baseline.   Psychiatric:         Mood and Affect: Mood normal.         Behavior: Behavior normal.          Result Review  Data Reviewed:{ Labs  Result Review  Imaging  Med Tab  Media :23}                   Assessment and Plan {CC Problem List  Visit Diagnosis  ROS  Review (Popup)  Health Maintenance  Quality  BestPractice  Medications  SmartSets  SnapShot Encounters  Media :23}   Diagnoses and all orders for this visit:    1. COVID-19 virus infection (Primary)    2. Body aches  -     POCT SARS-CoV-2 Antigen YOLIS +  Flu    Onsite testing positive for COVID. Discussed proper isolation measures and conservative management recommendations. No need for packed Slo-Bid at this time. She will keep me updated as to her progress.    Patient was given instructions and counseling regarding her condition or for health maintenance advice. Please see specific information pulled into the AVS (placed there by myself) if appropriate.    Return if symptoms worsen or fail to improve.      FARAZ Gibson MD

## 2023-05-26 ENCOUNTER — TRANSCRIBE ORDERS (OUTPATIENT)
Dept: ADMINISTRATIVE | Facility: HOSPITAL | Age: 53
End: 2023-05-26

## 2023-05-26 DIAGNOSIS — Z12.31 SCREENING MAMMOGRAM, ENCOUNTER FOR: Primary | ICD-10-CM

## 2023-05-31 ENCOUNTER — HOSPITAL ENCOUNTER (OUTPATIENT)
Dept: MAMMOGRAPHY | Facility: HOSPITAL | Age: 53
Discharge: HOME | End: 2023-05-31
Admitting: NURSE PRACTITIONER

## 2023-05-31 DIAGNOSIS — Z12.31 SCREENING MAMMOGRAM, ENCOUNTER FOR: ICD-10-CM

## 2023-05-31 PROCEDURE — 77063 BREAST TOMOSYNTHESIS BI: CPT

## 2023-05-31 PROCEDURE — 77067 SCR MAMMO BI INCL CAD: CPT

## 2023-09-01 ENCOUNTER — OFFICE VISIT (OUTPATIENT)
Dept: FAMILY MEDICINE CLINIC | Facility: CLINIC | Age: 53
End: 2023-09-01
Payer: MEDICAID

## 2023-09-01 VITALS
SYSTOLIC BLOOD PRESSURE: 140 MMHG | WEIGHT: 124.8 LBS | RESPIRATION RATE: 16 BRPM | HEART RATE: 73 BPM | OXYGEN SATURATION: 98 % | HEIGHT: 62 IN | BODY MASS INDEX: 22.97 KG/M2 | DIASTOLIC BLOOD PRESSURE: 86 MMHG

## 2023-09-01 DIAGNOSIS — M25.511 ACUTE PAIN OF RIGHT SHOULDER: Primary | ICD-10-CM

## 2023-09-01 DIAGNOSIS — M54.2 NECK PAIN: ICD-10-CM

## 2023-09-01 NOTE — PROGRESS NOTES
"Subjective   Cherelle Bourgeois is a 52 y.o. female.     History of Present Illness   Patient presents with c/o right shoulder and neck pain that started a week ago. She reports that she was worried that she was having shingles recurrence as she felt a \"bump\" on the back of her neck. Patient denies any injury. She has taken advil for her discomfort.     The following portions of the patient's history were reviewed and updated as appropriate: allergies, current medications, past family history, past medical history, past social history, past surgical history and problem list.    Review of Systems   Constitutional:  Negative for chills, fatigue and fever.   Respiratory:  Negative for cough, chest tightness, shortness of breath and wheezing.    Cardiovascular:  Negative for chest pain, palpitations and leg swelling.   Musculoskeletal:  Positive for arthralgias (Right shoulder pain) and neck pain.   Neurological:  Negative for dizziness and headache.   Hematological: Negative.    Psychiatric/Behavioral: Negative.  Negative for sleep disturbance.      Objective   Physical Exam  Vitals and nursing note reviewed.   Constitutional:       Appearance: She is well-developed.   HENT:      Head: Normocephalic and atraumatic.   Eyes:      Conjunctiva/sclera: Conjunctivae normal.      Pupils: Pupils are equal, round, and reactive to light.   Neck:      Thyroid: No thyroid mass, thyromegaly or thyroid tenderness.      Comments: Posterior neck with two raised erythematous papular lesion. No blistering or clustering noted.   Cardiovascular:      Rate and Rhythm: Normal rate and regular rhythm.      Heart sounds: Normal heart sounds. No murmur heard.  Pulmonary:      Effort: Pulmonary effort is normal.      Breath sounds: Normal breath sounds.   Musculoskeletal:      Cervical back: Full passive range of motion without pain and normal range of motion.   Neurological:      Mental Status: She is alert and oriented to person, place, and " time.   Psychiatric:         Behavior: Behavior normal.         Thought Content: Thought content normal.         Judgment: Judgment normal.       Vitals:    09/01/23 1241   BP: 140/86   Pulse: 73   Resp: 16   SpO2: 98%     Body mass index is 22.83 kg/mý.      Procedures    Assessment & Plan   Problems Addressed this Visit    None  Visit Diagnoses       Acute pain of right shoulder    -  Primary    Neck pain              Diagnoses         Codes Comments    Acute pain of right shoulder    -  Primary ICD-10-CM: M25.511  ICD-9-CM: 719.41     Neck pain     ICD-10-CM: M54.2  ICD-9-CM: 723.1           Heat to neck  Ibuprofen over the counter as directed.  Encouraged patient to come in for annual preventative exam and address HM issues.          Return if symptoms worsen or fail to improve.

## 2024-04-11 ENCOUNTER — OFFICE VISIT (OUTPATIENT)
Dept: FAMILY MEDICINE CLINIC | Facility: CLINIC | Age: 54
End: 2024-04-11
Payer: COMMERCIAL

## 2024-04-11 VITALS
BODY MASS INDEX: 23.19 KG/M2 | OXYGEN SATURATION: 98 % | HEART RATE: 74 BPM | WEIGHT: 126 LBS | HEIGHT: 62 IN | SYSTOLIC BLOOD PRESSURE: 150 MMHG | RESPIRATION RATE: 18 BRPM | DIASTOLIC BLOOD PRESSURE: 94 MMHG

## 2024-04-11 DIAGNOSIS — Z12.31 ENCOUNTER FOR SCREENING MAMMOGRAM FOR MALIGNANT NEOPLASM OF BREAST: ICD-10-CM

## 2024-04-11 DIAGNOSIS — R92.343 EXTREMELY DENSE TISSUE OF BOTH BREASTS ON MAMMOGRAPHY: Primary | ICD-10-CM

## 2024-04-11 DIAGNOSIS — N64.4 BREAST PAIN, LEFT: ICD-10-CM

## 2024-04-11 RX ORDER — ALPRAZOLAM 0.5 MG/1
TABLET ORAL
COMMUNITY
Start: 2024-01-25

## 2024-04-11 RX ORDER — CLONAZEPAM 0.5 MG/1
0.5 TABLET ORAL EVERY MORNING
COMMUNITY
Start: 2024-01-25

## 2024-04-11 NOTE — PROGRESS NOTES
Subjective   Cherelle Bourgeois is a 53 y.o. female.     History of Present Illness   Patient presents with c/o left breast pain, chronic, ongoing for about 1 year. She reports that pain is in left outer quadrant of breast. She has dense breasts and will be due soon for her repeat mammogram which suggested follow-up breast MRI.  She does not know her family history, so is unsure if she has any breast cancer history.     The following portions of the patient's history were reviewed and updated as appropriate: allergies, current medications, past family history, past medical history, past social history, past surgical history and problem list.    Review of Systems   Constitutional:  Negative for chills, fatigue and fever.   Respiratory:  Negative for cough, chest tightness, shortness of breath and wheezing.    Cardiovascular:  Negative for chest pain, palpitations and leg swelling.   Neurological:  Negative for dizziness and headache.   Hematological: Negative.    Psychiatric/Behavioral: Negative.  Negative for sleep disturbance.        Objective   Physical Exam  Vitals and nursing note reviewed.   Constitutional:       Appearance: She is well-developed.   HENT:      Head: Normocephalic and atraumatic.   Eyes:      Conjunctiva/sclera: Conjunctivae normal.      Pupils: Pupils are equal, round, and reactive to light.   Cardiovascular:      Rate and Rhythm: Normal rate and regular rhythm.      Heart sounds: Normal heart sounds. No murmur heard.  Pulmonary:      Effort: Pulmonary effort is normal.      Breath sounds: Normal breath sounds.   Chest:      Chest wall: No lacerations, swelling, tenderness or edema.       Neurological:      Mental Status: She is alert and oriented to person, place, and time.   Psychiatric:         Behavior: Behavior normal.         Thought Content: Thought content normal.         Judgment: Judgment normal.         Vitals:    04/11/24 1550   BP: 150/94   Pulse: 74   Resp: 18   SpO2: 98%     Body  mass index is 23.05 kg/m².      Procedures    Assessment & Plan   Problems Addressed this Visit    None  Visit Diagnoses       Extremely dense tissue of both breasts on mammography    -  Primary    Relevant Orders    MRI breast bilateral screening w wo contrast    Encounter for screening mammogram for malignant neoplasm of breast        Relevant Orders    MRI breast bilateral screening w wo contrast    Breast pain, left        Relevant Orders    MRI breast bilateral screening w wo contrast          Diagnoses         Codes Comments    Extremely dense tissue of both breasts on mammography    -  Primary ICD-10-CM: R92.343  ICD-9-CM: 793.82     Encounter for screening mammogram for malignant neoplasm of breast     ICD-10-CM: Z12.31  ICD-9-CM: V76.12     Breast pain, left     ICD-10-CM: N64.4  ICD-9-CM: 611.71             MRI breast bilateral screening         Return if symptoms worsen or fail to improve.  Answers submitted by the patient for this visit:  Other (Submitted on 4/11/2024)  Please describe your symptoms.: Persistent breast pain, left side  Have you had these symptoms before?: Yes  How long have you been having these symptoms?: Greater than 2 weeks  Primary Reason for Visit (Submitted on 4/11/2024)  What is the primary reason for your visit?: Other

## 2024-04-19 ENCOUNTER — HOSPITAL ENCOUNTER (OUTPATIENT)
Dept: MRI IMAGING | Facility: HOSPITAL | Age: 54
Discharge: HOME | End: 2024-04-19
Admitting: NURSE PRACTITIONER
Payer: COMMERCIAL

## 2024-04-19 DIAGNOSIS — N64.4 BREAST PAIN, LEFT: ICD-10-CM

## 2024-04-19 DIAGNOSIS — Z12.31 ENCOUNTER FOR SCREENING MAMMOGRAM FOR MALIGNANT NEOPLASM OF BREAST: ICD-10-CM

## 2024-04-19 DIAGNOSIS — R92.343 EXTREMELY DENSE TISSUE OF BOTH BREASTS ON MAMMOGRAPHY: ICD-10-CM

## 2024-04-19 PROCEDURE — 77049 MRI BREAST C-+ W/CAD BI: CPT

## 2024-04-19 PROCEDURE — A9577 INJ MULTIHANCE: HCPCS | Performed by: NURSE PRACTITIONER

## 2024-04-19 PROCEDURE — 0 GADOBENATE DIMEGLUMINE 529 MG/ML SOLUTION: Performed by: NURSE PRACTITIONER

## 2024-04-19 RX ADMIN — GADOBENATE DIMEGLUMINE 11 ML: 529 INJECTION, SOLUTION INTRAVENOUS at 07:25

## 2024-11-18 ENCOUNTER — OFFICE VISIT (OUTPATIENT)
Dept: FAMILY MEDICINE CLINIC | Facility: CLINIC | Age: 54
End: 2024-11-18
Payer: COMMERCIAL

## 2024-11-18 VITALS
HEIGHT: 62 IN | BODY MASS INDEX: 24.48 KG/M2 | HEART RATE: 76 BPM | WEIGHT: 133 LBS | RESPIRATION RATE: 18 BRPM | SYSTOLIC BLOOD PRESSURE: 138 MMHG | OXYGEN SATURATION: 98 % | DIASTOLIC BLOOD PRESSURE: 90 MMHG

## 2024-11-18 DIAGNOSIS — E55.9 VITAMIN D DEFICIENCY: ICD-10-CM

## 2024-11-18 DIAGNOSIS — M19.041 OSTEOARTHRITIS OF RIGHT HAND, UNSPECIFIED OSTEOARTHRITIS TYPE: ICD-10-CM

## 2024-11-18 DIAGNOSIS — R92.343 EXTREMELY DENSE TISSUE OF BOTH BREASTS ON MAMMOGRAPHY: Primary | ICD-10-CM

## 2024-11-18 DIAGNOSIS — N64.4 BREAST PAIN, LEFT: ICD-10-CM

## 2024-11-18 PROCEDURE — 99396 PREV VISIT EST AGE 40-64: CPT | Performed by: NURSE PRACTITIONER

## 2024-11-18 RX ORDER — ERGOCALCIFEROL 1.25 MG/1
50000 CAPSULE, LIQUID FILLED ORAL
Qty: 12 CAPSULE | Refills: 1 | Status: SHIPPED | OUTPATIENT
Start: 2024-11-18

## 2024-11-18 NOTE — PROGRESS NOTES
Subjective   Cherelle Bourgeois is a 54 y.o. female.     Chief Complaint   Patient presents with    Breast Pain     L breast, pt states that there is a recurring itch on the inside    Med Refill     Vit d          History of Present Illness     History of Present Illness  The patient presents for evaluation of multiple medical concerns.    She reports experiencing a deep, internal itch in her left breast, which she describes as similar to a mosquito bite. This is not a new symptom, as she has sought medical attention for it in the past. Additionally, she notes that the skin on her left breast feels slightly rougher than usual. She has previously consulted a dermatologist about this issue, but no abnormalities were found. Patient had MRI of her breasts earlier this year, which was normal.     She was diagnosed with osteoporosis and arthritis in her right hand by a rheumatologist several years ago. Recently, she has been experiencing increased stiffness in her hand, particularly during travel. She is considering the use of a compression glove to alleviate this discomfort.    She underwent a colonoscopy in 2009, prior to her hysterectomy. She is also seeking a refill of her vitamin D prescription.     The following portions of the patient's history were reviewed and updated as appropriate: allergies, current medications, past family history, past medical history, past social history, past surgical history and problem list.    Review of Systems   Constitutional:  Negative for chills, fatigue and fever.   Respiratory:  Negative for cough, chest tightness, shortness of breath and wheezing.    Cardiovascular:  Negative for chest pain, palpitations and leg swelling.   Genitourinary:  Positive for breast pain. Negative for breast discharge and breast lump.        Internal breast itching Left breast   Neurological:  Negative for dizziness and headache.   Hematological: Negative.    Psychiatric/Behavioral: Negative.  Negative  for sleep disturbance.        Objective   Physical Exam  Vitals and nursing note reviewed.   Constitutional:       Appearance: She is well-developed.   HENT:      Head: Normocephalic and atraumatic.   Eyes:      Conjunctiva/sclera: Conjunctivae normal.      Pupils: Pupils are equal, round, and reactive to light.   Cardiovascular:      Rate and Rhythm: Normal rate and regular rhythm.      Heart sounds: Normal heart sounds. No murmur heard.  Pulmonary:      Effort: Pulmonary effort is normal.      Breath sounds: Normal breath sounds.   Neurological:      Mental Status: She is alert and oriented to person, place, and time.   Psychiatric:         Behavior: Behavior normal.         Thought Content: Thought content normal.         Judgment: Judgment normal.         Vitals:    11/18/24 0927   BP: 138/90   Pulse: 76   Resp: 18   SpO2: 98%     Body mass index is 24.33 kg/m².      Procedures    Assessment & Plan   Problems Addressed this Visit    None  Visit Diagnoses       Extremely dense tissue of both breasts on mammography    -  Primary    Relevant Orders    Ambulatory Referral to Breast Surgery    Breast pain, left        Relevant Orders    Ambulatory Referral to Breast Surgery    Vitamin D deficiency        Relevant Medications    vitamin D (ERGOCALCIFEROL) 1.25 MG (57015 UT) capsule capsule    Osteoarthritis of right hand, unspecified osteoarthritis type              Diagnoses         Codes Comments    Extremely dense tissue of both breasts on mammography    -  Primary ICD-10-CM: R92.343  ICD-9-CM: 793.82     Breast pain, left     ICD-10-CM: N64.4  ICD-9-CM: 611.71     Vitamin D deficiency     ICD-10-CM: E55.9  ICD-9-CM: 268.9     Osteoarthritis of right hand, unspecified osteoarthritis type     ICD-10-CM: M19.041  ICD-9-CM: 715.94             Assessment & Plan  1. Left breast itching.  The patient reports deep internal itching in the left breast, which has been persistent. Given her dense breast tissue and the concern  for inflammatory breast cancer, a referral to Dr. Mary Ortega, a breast surgeon, has been made for further evaluation. There are no changes with the nipple, discharge, bleeding, or inversion. The patient will be evaluated for any underlying issues that may not be detectable through mammograms or MRIs.    2. Right hand pain.  The patient reports increased stiffness and pain in her right hand, particularly in the fingers, which is due to osteoarthritis. She has been advised to use a wrist brace during sleep and to take NSAIDs, such as ibuprofen, preemptively before travel to alleviate symptoms. If the pain becomes more severe, finger or joint injections may be considered, and she should consult an orthopedic surgeon specializing in hand conditions.    3. Vitamin D deficiency.  A prescription for vitamin D has been refilled at [facility] in [place].    4. Health maintenance.  She is due for colorectal cancer screening. Her last colonoscopy was in 2009. She plans to make an appointment at a facility in [place] that her  used, which does not require a referral.       Assessment & Plan  Extremely dense tissue of both breasts on mammography    Orders:    Ambulatory Referral to Breast Surgery    Breast pain, left    Orders:    Ambulatory Referral to Breast Surgery    Vitamin D deficiency    Orders:    vitamin D (ERGOCALCIFEROL) 1.25 MG (55042 UT) capsule capsule; Take 1 capsule by mouth Every 7 (Seven) Days.    Osteoarthritis of right hand, unspecified osteoarthritis type                Return if symptoms worsen or fail to improve.    Patient or patient representative verbalized consent for the use of Ambient Listening during the visit with  SAMANTHA Zarate for chart documentation. 11/18/2024  09:42 EST

## 2024-11-19 ENCOUNTER — TELEPHONE (OUTPATIENT)
Dept: SURGERY | Facility: CLINIC | Age: 54
End: 2024-11-19
Payer: COMMERCIAL

## 2024-11-19 NOTE — TELEPHONE ENCOUNTER
Spoke to pt about new pt appt with katina pickard but phone cut off suddenly I tried to call back but went straight to [facility]

## 2024-11-19 NOTE — TELEPHONE ENCOUNTER
Spoke to pt and got her vaishali for a new pt appt with katina pickard for breast pain on 1/30    Pt stated understanding  Mailed new pt chart

## 2025-01-03 ENCOUNTER — TELEPHONE (OUTPATIENT)
Dept: FAMILY MEDICINE CLINIC | Facility: CLINIC | Age: 55
End: 2025-01-03
Payer: COMMERCIAL

## 2025-01-03 DIAGNOSIS — R92.343 EXTREMELY DENSE TISSUE OF BOTH BREASTS ON MAMMOGRAPHY: Primary | ICD-10-CM

## 2025-01-03 NOTE — TELEPHONE ENCOUNTER
[facility] needs an order for a: Bilateral Diagnostic Mammogram with Ultrasound.    Fax#: [phone]

## 2025-01-24 ENCOUNTER — HOSPITAL ENCOUNTER (OUTPATIENT)
Dept: MAMMOGRAPHY | Facility: HOSPITAL | Age: 55
Discharge: HOME | End: 2025-01-24
Payer: COMMERCIAL

## 2025-01-24 ENCOUNTER — HOSPITAL ENCOUNTER (OUTPATIENT)
Dept: ULTRASOUND IMAGING | Facility: HOSPITAL | Age: 55
Discharge: HOME | End: 2025-01-24
Payer: COMMERCIAL

## 2025-01-24 DIAGNOSIS — R92.343 EXTREMELY DENSE TISSUE OF BOTH BREASTS ON MAMMOGRAPHY: ICD-10-CM

## 2025-01-24 PROCEDURE — G0279 TOMOSYNTHESIS, MAMMO: HCPCS

## 2025-01-24 PROCEDURE — 76642 ULTRASOUND BREAST LIMITED: CPT

## 2025-01-24 PROCEDURE — 77066 DX MAMMO INCL CAD BI: CPT

## 2025-01-28 NOTE — PROGRESS NOTES
[facility]     Referring Provider: SAMANTHA Zarate     Chief complaint: breast pain     HPI: Ms. Cherelle Bourgeois is a 55 yo woman, seen at the request of SAMANTHA Zarate, for breast pain    I personally reviewed her records and summarized her relevant breast history/imagin2021 bilateral diagnostic mammogram and left limited ultrasound at [facility]  The breast tissue is heterogeneously dense.   Right breast: No suspicious masses, calcifications, or areas of distortion are seen.   Left breast: The patient's area of pain is marked with a skin BB. No discrete abnormality is seen in the region of the skin marker. No suspicious masses, calcifications, or areas of distortion are seen.   ULTRASOUND FINDINGS:   Targeted sonographic imaging of the left breast was performed. In the patient's area of pain in the 4/5 o'clock region of the left breast approximately 6 cm from the nipple there is normal breast tissue. No suspicious mass is seen. There is no   architectural distortion.   IMPRESSION:    1. Right breast: No suspicious abnormality identified. Recommendation at this time is for continued routine annual screening mammogram.   2. Left breast: No sonographic or mammographic abnormality identified in the patient's area of pain. Clinical findings with negative imaging should be managed on clinical basis. Based on today's imaging, recommendation at this time is for continued   routine annual screening mammogram.   3. Today's findings and recommendations were discussed with the patient the time of the examination.   Overall assessment: BI-RADS Category 1,     2023 bilateral screening mammogram at [facility]  FINDINGS: Bilateral digital CC and MLO mammographic and digital  Tomosynthesis images were obtained. Comparison is made to prior studies  dated 2016 . The parenchyma of both breasts demonstrates a  heterogeneously dense pattern which lessens the sensitivity. I see no  new or dominant  masses, areas of architectural distortion or skin  thickening. There is no evidence for axillary lymphadenopathy or nipple  retraction.  IMPRESSION:  1. There is no evidence for malignancy or significant change in either  breast. Routine followup mammography is recommended.  Given the density  of the patient's breast tissue, correlation with screening bilateral  breast sonography and/or bilateral breast MRI may provide additional  benefit.  BI-RADS category 1    4/19/2024 bilateral breast MRI [facility]  FINDINGS: There is heterogeneous fibroglandular tissue. There is minimal  background parenchymal enhancement.  RIGHT BREAST:    No suspicious enhancing mass or area of non-mass enhancement is  identified.  The visualized axilla is within normal limits.  LEFT BREAST:    No suspicious enhancing mass or area of non-mass enhancement is  identified.  The visualized axilla is within normal limits.  EXTRAMAMMARY FINDINGS:  There are no pathologically enlarged internal mammary chain lymph nodes  on either side.    There is trace bilateral pleural fluid.  IMPRESSION AND RECOMMENDATION:  No MRI evidence of malignancy in either breast. Recommend annual  screening mammogram in May 2024.  BI-RADS Category 1    1/24/2025 bilateral diagnostic mammogram and left limited breast ultrasound at [facility]  Standard images and R2 computerized assisted detection were obtained  followed by digital tomosynthesis. The breasts are heterogeneously  dense, which may obscure small masses. Allowing for the dense  parenchyma, there are no findings to suggest malignancy. Correlating  directed left breast ultrasound shows no abnormality in the 2 to 6  o'clock region. The patient also has fairly recent breast MRI scan on  04/19/2024 reporting no suspicious abnormality at that time.  CONCLUSION:   1. Negative bilateral mammogram showing no change from 05/31/2023 or  04/27/2021. Continued annual mammography is recommended.  2. Negative directed left breast  ultrasound.  BI-RADS 1. Negative.       She has a personal history of 2009 total hysterectomy,      She denies any family history of breast or ovarian cancer.     Today she presents with left breast pain that comes and goes over the past 2 years. Wears a Sports bra most of the time, she does wear a sports bra at home. Sometimes will sleep in a bra.  Also she does not know very much about her family history so is requesting genetic testing.  She understands that she will have to pay out-of-pocket for this test.  She denies any breast lumps, skin changes, or nipple discharge.  She denies any prior history of abnormal mammograms or breast biopsies.       Review of Systems - Oncology    Medications:    Current Outpatient Medications:     ALPRAZolam (XANAX) 0.5 MG tablet, TAKE 1 TABLET BY MOUTH IN THE AFTERNOON, Disp: , Rfl:     clonazePAM (KlonoPIN) 0.5 MG tablet, Take 1 tablet by mouth Every Morning., Disp: , Rfl:     FLUoxetine (PROzac) 20 MG capsule, Take 4 capsules by mouth Daily., Disp: , Rfl:     vitamin D (ERGOCALCIFEROL) 1.25 MG (55277 UT) capsule capsule, Take 1 capsule by mouth Every 7 (Seven) Days., Disp: 12 capsule, Rfl: 1    Allergies:  Allergies   Allergen Reactions    Amoxicillin Hives    Augmentin [Amoxicillin-Pot Clavulanate] Hives    Cephalexin Hives       Medical history:  Past Medical History:   Diagnosis Date    ADHD (attention deficit hyperactivity disorder)     No meds taken at this time    Anxiety and depression     denies SI/HI. Doing well on meds.  Risk/benefits of meds discussed (abuse, addiction, dementia, CVA).  Car, contract and urine tox in chart.    Arthritis     Broken rib     10 days ago leaned over to get something out of car    Chronic back pain     followed by chiropractor    Hypothyroidism     Postmenopausal     due to hysterectomy       Surgical History:  Past Surgical History:   Procedure Laterality Date     SECTION      HYSTERECTOMY      Total hysterectomy  () - endometriosis    OOPHORECTOMY      TOOTH EXTRACTION         Family History:  Family History   Problem Relation Age of Onset    Coronary artery disease Father 42    Coronary artery disease Sister 50        (50's)    Coronary artery disease Brother 42    Coronary artery disease Maternal Grandfather 40        (40's)    Coronary artery disease Paternal Grandfather 41    Coronary artery disease Sister 50        (50's)    Hypertension Mother        Social History:   Social History     Socioeconomic History    Marital status:    Tobacco Use    Smoking status: Never    Smokeless tobacco: Never   Vaping Use    Vaping status: Never Used   Substance and Sexual Activity    Alcohol use: Yes     Alcohol/week: 2.0 standard drinks of alcohol     Types: 2 Glasses of wine per week     Comment: 1-2 per month    Drug use: Never    Sexual activity: Yes     Partners: Male     Birth control/protection: Post-menopausal, Hysterectomy     Comment:      Patient drinks 1-2 servings of caffeine per day.       GYNECOLOGIC HISTORY:   . P: 1. AB: 0.  Last menstrual period: 38 pt had total hysterectomy in   Age at menarche: 10  Age at first childbirth: 28  Lactation/How lon week  Age at menopause: 38  Total years of oral contraceptive use: 10 years  Total years of hormone replacement therapy: 5 years estradiol patch       Physical Exam  There were no vitals filed for this visit.  ECOG 0 - Asymptomatic  General: NAD, well appearing  Psych: a&o x 3, normal mood and affect  Eyes: EOMI, no scleral icterus  ENMT: neck supple without masses or thyromegaly, mucus membranes moist  Resp: normal effort, CTAB  CV: RRR, no murmurs, no edema   GI: soft, NT, ND  MSK: normal gait, normal ROM in bilateral shoulders  Lymph nodes: no cervical, supraclavicular or axillary lymphadenopathy  Breast: symmetric, large  Right: No visible abnormalities on inspection while seated, with arms raised or hands on hips. No masses, skin changes,  or nipple abnormalities.  Left: No visible abnormalities on inspection while seated, with arms raised or hands on hips. No masses, skin changes, or nipple abnormalities.      Assessment:    Dense breast  Breast pain- left breast    Discussion:  Breast density describes how the breasts look on a mammogram.  Breast and connective tissue are denser than fat and this difference shows up on the mammogram.  Young women often have dense breasts.  As we age, breast become less dense.  Dense breast can make it harder to find breast cancer on the mammogram.  Women with high breast density have an increased risk of breast cancer.  Educational materials regarding breast density were given and reviewed.  Tomosynthesis imaging will be completed with next screening study.  We had a lengthy discussion about breast pain and how it can sometimes be difficult to treat. We discussed that this is often related to hormonal changes. Caffeine and nicotine can also play a role in breast pain. We also discussed the importance of wearing a good supportive bra at all times including bedtime.  We discussed additional therapies such as vitamin E supplementation and Primrose oil, and that these may or may not be useful. We discussed using OTC tylenol or ibuprofen for pain control. Finally, we discussed the use of topical NSAID creams.   3. In reviewing the patient's personal and family history of cancer, including the number, types and age of diagnosis, we found that the patient is eligible for genetic testing based on the National comprehensive cancer network (NCCN) guidelines.  The patient is aware that testing may reveal a pathogenic variant in any 1 of several genes, and that a pathogenic variant can markedly increase the risk of variety of cancers.  The types of cancers in the level of risk is dependent on which gene is affected.  The patient is also aware that we may find no pathogenic variants or that we could find what is called a variant  of uncertain significance(VUS), which at this point in time has no direct impact on medical care.  In either these cases, risk would be determined by the family history alone, which would determine further management.  The patient knows that if we find a pathogenic variant, that her family members are also at risk and they should be informed of this information.    The purpose of the test is to identify whether you carry a gene variant (mutation) that might increase your risk of developing cancer or other diseases.    The reliability of positive or negative test results and the level of certainty that a positive test result for that disease or condition serves as a predictor of such disease: This test is not perfect.  If you carry a gene variant (mutation) and a cancer causing gene and increases your risk of cancer but does not mean you develop cancer.  Also, if you do not carry a gene variant (mutation), it does not mean you will not get cancer.  This test will help us better manage you in order to help find cancer earlier or prevent it.  If you develop or have cancer, it may help us better treat that disease.    If you do carry a pathogenic gene variant (mutation), you will be given a management strategy to help minimize your risk and we will discuss what it means for you.  You are being tested for multiple genes, and each has its own set of cancers that it predisposes to in each cancer gene combination shows a different level of risk.  If you carry a gene variant(mutation) we have discussed the specific risk of cancer for that gene and arrange management that minimizes your future risk.    You may also be found to have a change in imaging that we do not understand (variant of uncertain significance (VUS)).  We will let you know and then manage this is a negative result.  Most of these turn out to be benign as we learn more.     For those patients who do not have cancer, a positive test can compromise the ability  to obtain health and disability insurance, so your coverage should be satisfactory to you prior to testing.    Cost: Your insurance will likely cover the cost of genetic testing.  If you are insured denies payment, the testing lab will contact you to see if you are willing to pay out-of-pocket.  If so, the maximum cost is around $300.  If you are not willing to pay in your insurance or denies payment, the test will be canceled.    The patient agreed to proceed with testing and a 74 gene [employer] panel was ordered.      Plan:  1. Wear sports bras during the day when possible. Wear sports bra or tight camisole at night while sleeping. Forlest  2. Take Vitamin E, 400 U daily   3. Continue to reduce caffeine intake.   4. May use OTC tylenol or ibuprofen, or topical products, for pain control.   5. F/u in 3 months. Call sooner with any questions, concerns   6. Genetic  testing will call with results  Interventions for breast discomfort reduction were given and reviewed.  She will return to clinic in 3 months for evaluation of their effectiveness with exam only.      SAMANTHA Smith have spent 35 mins in face to face time with the patient and in chart review.    CC:  SAMANTHA Zarate Jennifer J, APRN    EMR Dragon/transcription disclaimer:  Dictated using Dragon dictation

## 2025-01-30 ENCOUNTER — OFFICE VISIT (OUTPATIENT)
Dept: SURGERY | Facility: CLINIC | Age: 55
End: 2025-01-30
Payer: COMMERCIAL

## 2025-01-30 VITALS
SYSTOLIC BLOOD PRESSURE: 140 MMHG | HEIGHT: 62 IN | DIASTOLIC BLOOD PRESSURE: 82 MMHG | BODY MASS INDEX: 24.66 KG/M2 | WEIGHT: 134 LBS | HEART RATE: 72 BPM | OXYGEN SATURATION: 97 %

## 2025-01-30 DIAGNOSIS — N64.4 BREAST PAIN: Primary | ICD-10-CM

## 2025-01-30 PROCEDURE — 99214 OFFICE O/P EST MOD 30 MIN: CPT | Performed by: NURSE PRACTITIONER

## 2025-02-17 ENCOUNTER — TELEPHONE (OUTPATIENT)
Dept: SURGERY | Facility: CLINIC | Age: 55
End: 2025-02-17
Payer: COMMERCIAL

## 2025-02-17 NOTE — TELEPHONE ENCOUNTER
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  At this time the lab does not know if the variant found is a cancer causing mutation or just a normal variation. They will try to reclassify it over time. If it is reclassified as a normal varient, you will be notified, if it is reclassified as a mutation, follow up in [facility] for additional discussion will be recommended.     I offered genetic counseling she declined at this time   Pt stated understanding

## 2025-02-17 NOTE — TELEPHONE ENCOUNTER
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  At this time the lab does not know if the variant found is a cancer causing mutation or just a normal variation. They will try to reclassify it over time. If it is reclassified as a normal varient, you will be notified, if it is reclassified as a mutation, follow up in the [facility] for additional discussion will be recommended.

## 2025-04-14 DIAGNOSIS — E55.9 VITAMIN D DEFICIENCY: ICD-10-CM

## 2025-04-14 RX ORDER — ERGOCALCIFEROL 1.25 MG/1
50000 CAPSULE, LIQUID FILLED ORAL WEEKLY
Qty: 12 CAPSULE | Refills: 1 | Status: SHIPPED | OUTPATIENT
Start: 2025-04-14